# Patient Record
Sex: MALE | Race: WHITE | Employment: OTHER | URBAN - METROPOLITAN AREA
[De-identification: names, ages, dates, MRNs, and addresses within clinical notes are randomized per-mention and may not be internally consistent; named-entity substitution may affect disease eponyms.]

---

## 2017-08-24 ENCOUNTER — HOSPITAL ENCOUNTER (OUTPATIENT)
Dept: PHYSICAL THERAPY | Age: 57
Discharge: HOME | End: 2017-08-24
Payer: COMMERCIAL

## 2017-08-24 PROCEDURE — 97110 THERAPEUTIC EXERCISES: CPT | Performed by: PHYSICAL THERAPIST

## 2017-08-24 PROCEDURE — 97162 PT EVAL MOD COMPLEX 30 MIN: CPT | Performed by: PHYSICAL THERAPIST

## 2017-08-24 PROCEDURE — 97140 MANUAL THERAPY 1/> REGIONS: CPT | Performed by: PHYSICAL THERAPIST
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
## 2017-08-24 NOTE — PROGRESS NOTES
PT DAILY TREATMENT NOTE     Patient Name: Angelina Banegas  Date:2017  : 1960  [x]  Patient  Verified  Payor: [employer] / Plan: [employer] / Product Type: HMO /    In time:1010  Out time:1045  Total Treatment Time (min): 35  Visit #: 1 of 12    Treatment Area: Complete rotator cuff tear or rupture of right shoulder, not specified as traumatic [M75.121]    SUBJECTIVE  Pain Level (0-10 scale): 8/10  Any medication changes, allergies to medications, adverse drug reactions, diagnosis change, or new procedure performed?: [x] No    [] Yes (see summary sheet for update)  Subjective functional status/changes:   [] No changes reported  HPI: Pt c/o increased pain in the R shoulder and neck beginning about 3 months ago w/o known KHLOE. Reports pain increases w/ using the arm, moving it in the wrong direction, or bumping it. He is unsure what decreases his pain as it is always elevated. Denies n/t. Reports MRI shows a massive RTC tear that is un-repairable. Reports an injection yesterday but otherwise denies any other treatment for the pain and reports that he is skeptical of what we are going to accomplish and why he's in PT. Denies pervious surgeries or R shoulder injuries. Pt is R hand dominant. 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      OBJECTIVE    Modality rationale:  to improve the patients ability to    Min Type Additional Details    [] Estim:  []Unatt       []IFC  []Premod                        []Other:  []w/ice   []w/heat  Position:  Location:    [] Estim: []Att    []TENS instruct  []NMES                    []Other:  []w/US   []w/ice   []w/heat  Position:  Location:    []  Traction: [] Cervical       []Lumbar                       [] Prone          []Supine                       []Intermittent   []Continuous Lbs:  [] before manual  [] after manual    []  Ultrasound: []Continuous   [] Pulsed                           []1MHz   []3MHz W/cm2:  Location:    []  Iontophoresis with dexamethasone         Location: [] Take home patch   [] In clinic    []  Ice     []  heat  []  Ice massage  []  Laser   []  Anodyne Position:  Location:    []  Laser with stim  []  Other:  Position:  Location:    []  Vasopneumatic Device Pressure:       [] lo [] med [] hi   Temperature: [] lo [] med [] hi   [] Skin assessment post-treatment:  []intact []redness- no adverse reaction    []redness - adverse reaction:     17 min [x]Eval                  []Re-Eval       10 min Therapeutic Exercise:  [x] See flow sheet : instructed in and demo'd HEP, educated on purpose of PT w/ massive RTC tear and where we anticipate going and achieving. Rationale: increase ROM, increase strength, improve coordination and increase proprioception to improve the patients ability to decrease pain and improve activity tolerance      min Therapeutic Activity:  []  See flow sheet :   Rationale:   to improve the patients ability to       min Neuromuscular Re-education:  []  See flow sheet :   Rationale:   to improve the patients ability to     8 min Manual Therapy:  STJ mobs, manual str into flex, scap, IR and ER R shoulder, TPR/DTM to R UT and LS, and parascapular mm   Rationale: decrease pain, increase ROM, increase tissue extensibility and decrease trigger points to improve activity tolerance and mobility      min Gait Training:  ___ feet with ___ device on level surfaces with ___ level of assist   Rationale: With   [] TE   [] TA   [] neuro   [] other: Patient Education: [x] Review HEP    [] Progressed/Changed HEP based on:   [] positioning   [] body mechanics   [] transfers   [] heat/ice application    [] other:      Other Objective/Functional Measures: Pt presents w/ slumped posture and rounded shoulders. PROM R shoulder full w/ just min pain reported ar EROM all planes.  AROM R shoulder flex and scap WFL w/ pain reported from 110 deg up to EROM, IR to L1 behind the back and pain free, ER to occiput w/ compensations and pain reported. MMT R shoulder flex 4/5 w/pain, scap -4/5 w/ pain, IR 4/5 w/pain, ER 3+/5 w/ pain. Increased mm tightness noted in the R UT and LS, and parascapular mm. + empty can for pain R, + crossover R. C/S AROM WFL but painful into R SB and rotation. Pain Level (0-10 scale) post treatment: 6/10    ASSESSMENT/Changes in Function: Focus on improving scapular stability and R shoulder MMT to improve activity tolerance and decrease pain w/ sleeping. Patient will continue to benefit from skilled PT services to modify and progress therapeutic interventions, address functional mobility deficits, address ROM deficits, address strength deficits, analyze and address soft tissue restrictions, analyze and cue movement patterns, analyze and modify body mechanics/ergonomics, assess and modify postural abnormalities and instruct in home and community integration to attain remaining goals. [x]  See Plan of Care  []  See progress note/recertification  []  See Discharge Summary         Progress towards goals / Updated goals:  Short Term Goals: To be accomplished in 1 weeks:  1. Pt will be independent and complaint w/ HEP to progress gains in PT. At eval: initiated HEP  Long Term Goals: To be accomplished in 4 weeks:  1. Pt will improve FOTO to > or = to 67 to demo improved function. At eval: FOTO = 46  2. Pt will improve R shoulder AROM to pain free for ease w/ dressing. At eval: AROM R shoulder flex and scap WFL w/ pain reported from 110 deg up to EROM, IR to L1 behind the back and pain free, ER to occiput w/ compensations and pain reported  3. Pt will increase R shoulder MMT to > or = to 4-4+/5 and pain free grossly for ease w/ shooting a rifle. At eval: MMT R shoulder flex 4/5 w/pain, scap -4/5 w/ pain, IR 4/5 w/pain, ER 3+/5 w/ pain  4. Pt will report > or = to 60% improvement in symptoms for ease w/ pain free work tasks. At eval: 0%    PLAN  []  Upgrade activities as tolerated     []  Continue plan of care  []  Update interventions per flow sheet       []  Discharge due to:_  [x]  Other: 3x/4 weeks      Bebe Messina, PT 8/24/2017  2:39 PM    Future Appointments  Date Time Provider Hosea Kong   8/28/2017 9:00 AM Jaycee E Laws, PTA [facility]   9/1/2017 9:00 AM Jaycee E Laws, PTA [facility]   9/6/2017 8:30 AM Jaycee E Laws, PTA [facility]   9/7/2017 9:00 AM Jaycee E Laws, PTA [facility]   9/11/2017 8:30 AM Jaycee E Laws, PTA [facility]   9/15/2017 9:00 AM Jaycee E Laws, PTA [facility]   9/20/2017 9:00 AM Jaycee E Laws, PTA [facility]   9/21/2017 10:30 AM Jaycee E Laws, PTA [facility]   9/25/2017 9:00 AM Jaycee E Laws, PTA [facility]   9/29/2017 10:00 AM Bebe Messina, PT [facility]

## 2017-08-28 ENCOUNTER — HOSPITAL ENCOUNTER (OUTPATIENT)
Dept: PHYSICAL THERAPY | Age: 57
Discharge: HOME | End: 2017-08-28
Payer: COMMERCIAL

## 2017-08-28 PROCEDURE — 97110 THERAPEUTIC EXERCISES: CPT

## 2017-08-28 PROCEDURE — 97112 NEUROMUSCULAR REEDUCATION: CPT

## 2017-08-28 PROCEDURE — 97140 MANUAL THERAPY 1/> REGIONS: CPT

## 2017-08-28 NOTE — PROGRESS NOTES
PT DAILY TREATMENT NOTE     Patient Name: Valeria Ellis  Date:2017  : 1960  [x]  Patient  Verified  Payor: Christie Ayers / Plan: [address] [employer] / Product Type: HMO /    In time: 8:55  Out time:9:50  Total Treatment Time (min): 55   Visit #: 2 of 12    Treatment Area: Complete rotator cuff tear or rupture of right shoulder, not specified as traumatic [M75.121]    SUBJECTIVE  Pain Level (0-10 scale): 6/10  Any medication changes, allergies to medications, adverse drug reactions, diagnosis change, or new procedure performed?: [x] No    [] Yes (see summary sheet for update)  Subjective functional status/changes:   [] No changes reported  Pt states that pushing up on the arm increases pain. He reports he feels the his lack of strength is pushing away from his body.      OBJECTIVE    Modality rationale:    Min Type Additional Details    [] Estim:  []Unatt       []IFC  []Premod                        []Other:  []w/ice   []w/heat  Position:  Location:    [] Estim: []Att    []TENS instruct  []NMES                    []Other:  []w/US   []w/ice   []w/heat  Position:  Location:    []  Traction: [] Cervical       []Lumbar                       [] Prone          []Supine                       []Intermittent   []Continuous Lbs:  [] before manual  [] after manual    []  Ultrasound: []Continuous   [] Pulsed                           []1MHz   []3MHz W/cm2:  Location:    []  Iontophoresis with dexamethasone         Location: [] Take home patch   [] In clinic    []  Ice     []  heat  []  Ice massage  []  Laser   []  Anodyne Position:  Location:    []  Laser with stim  []  Other:  Position:  Location:    []  Vasopneumatic Device Pressure:       [] lo [] med [] hi   Temperature: [] lo [] med [] hi   [] Skin assessment post-treatment:  []intact []redness- no adverse reaction    []redness - adverse reaction:     15 min Therapeutic Exercise:  [x] See flow sheet :   Rationale: increase ROM and increase strength to improve the patients ability to increase ease of ADLs. 30 min Neuromuscular Re-education:  [x]  See flow sheet :   Rationale: increase strength  to improve the patients ability to perform work duties. 10 min Manual Therapy:  Per flow sheet   Rationale: decrease pain, increase tissue extensibility and decrease trigger points to increase ease of shooting his firearm. With   [] TE   [] TA   [] neuro   [] other: Patient Education: [x] Review HEP    [] Progressed/Changed HEP based on:   [] positioning   [] body mechanics   [] transfers   [] heat/ice application    [] other:      Other Objective/Functional Measures: Pt reports compliance of HEP 1X per day. Pain Level (0-10 scale) post treatment: 7/10    ASSESSMENT/Changes in Function: No TTP of parascapular mm. TP in R UT.  VCs to decrease UT substitution. Patient will continue to benefit from skilled PT services to modify and progress therapeutic interventions, address functional mobility deficits, address ROM deficits and address strength deficits to attain remaining goals. []  See Plan of Care  []  See progress note/recertification  []  See Discharge Summary         Progress towards goals / Updated goals:  Short Term Goals: To be accomplished in 1 weeks:  1. Pt will be independent and complaint w/ HEP to progress gains in PT. At eval: initiated HEP  Current: Progressing, pt is compliant with HEP 1x per day. 8/28/17   Long Term Goals: To be accomplished in 4 weeks:  1. Pt will improve FOTO to > or = to 67 to demo improved function. At eval: FOTO = 46  2. Pt will improve R shoulder AROM to pain free for ease w/ dressing. At eval: AROM R shoulder flex and scap WFL w/ pain reported from 110 deg up to EROM, IR to L1 behind the back and pain free, ER to occiput w/ compensations and pain reported  3. Pt will increase R shoulder MMT to > or = to 4-4+/5 and pain free grossly for ease w/ shooting a rifle.    At eval: MMT R shoulder flex 4/5 w/pain, scap -4/5 w/ pain, IR 4/5 w/pain, ER 3+/5 w/ pain  4. Pt will report > or = to 60% improvement in symptoms for ease w/ pain free work tasks.    At eval: 0%    PLAN  []  Upgrade activities as tolerated     [x]  Continue plan of care  []  Update interventions per flow sheet       []  Discharge due to:_  []  Other:_      Jaycee E Laws, PTA 8/28/2017  9:55 AM    Future Appointments  Date Time Provider Hosea Kong   9/1/2017 9:00 AM Jaycee E Laws, PTA [facility]   9/6/2017 8:30 AM Jaycee E Laws, PTA [facility]   9/7/2017 9:00 AM Jaycee E Laws, PTA [facility]   9/11/2017 8:30 AM Jaycee E Laws, PTA [facility]   9/15/2017 9:00 AM Jaycee E Laws, PTA [facility]   9/20/2017 9:00 AM Jaycee E Laws, PTA [facility]   9/21/2017 10:30 AM Jaycee E Laws, PTA [facility]   9/25/2017 9:00 AM Jaycee E Laws, PTA [facility]   9/29/2017 10:00 AM Dean Cole, PT [facility]

## 2017-09-01 ENCOUNTER — HOSPITAL ENCOUNTER (OUTPATIENT)
Dept: PHYSICAL THERAPY | Age: 57
Discharge: HOME | End: 2017-09-01
Payer: COMMERCIAL

## 2017-09-01 PROCEDURE — 97140 MANUAL THERAPY 1/> REGIONS: CPT

## 2017-09-01 PROCEDURE — 97112 NEUROMUSCULAR REEDUCATION: CPT

## 2017-09-01 PROCEDURE — 97110 THERAPEUTIC EXERCISES: CPT
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
## 2017-09-06 ENCOUNTER — HOSPITAL ENCOUNTER (OUTPATIENT)
Dept: PHYSICAL THERAPY | Age: 57
Discharge: HOME | End: 2017-09-06
Payer: COMMERCIAL

## 2017-09-06 PROCEDURE — 97140 MANUAL THERAPY 1/> REGIONS: CPT

## 2017-09-06 PROCEDURE — 97110 THERAPEUTIC EXERCISES: CPT

## 2017-09-06 PROCEDURE — 97112 NEUROMUSCULAR REEDUCATION: CPT

## 2017-09-06 NOTE — PROGRESS NOTES
PT DAILY TREATMENT NOTE     Patient Name: Teresa Urrutia  Date:2017  : 1960  [x]  Patient  Verified  Payor: Kathe Cobian / Plan: [facility] [employer]  [facility] PT / Product Type: Commerical /    In time: 8:29  Out time:9:20  Total Treatment Time (min): 51  Visit #: 4 of 12    Treatment Area: Complete rotator cuff tear or rupture of right shoulder, not specified as traumatic [M75.121]    SUBJECTIVE  Pain Level (0-10 scale): 3/10  Any medication changes, allergies to medications, adverse drug reactions, diagnosis change, or new procedure performed?: [x] No    [] Yes (see summary sheet for update)  Subjective functional status/changes:   [] No changes reported  Pt states his shoulder is feeling better compared to last time. OBJECTIVE    Modality rationale:    Min Type Additional Details    [] Estim:  []Unatt       []IFC  []Premod                        []Other:  []w/ice   []w/heat  Position:  Location:    [] Estim: []Att    []TENS instruct  []NMES                    []Other:  []w/US   []w/ice   []w/heat  Position:  Location:    []  Traction: [] Cervical       []Lumbar                       [] Prone          []Supine                       []Intermittent   []Continuous Lbs:  [] before manual  [] after manual    []  Ultrasound: []Continuous   [] Pulsed                           []1MHz   []3MHz W/cm2:  Location:    []  Iontophoresis with dexamethasone         Location: [] Take home patch   [] In clinic    []  Ice     []  heat  []  Ice massage  []  Laser   []  Anodyne Position:  Location:    []  Laser with stim  []  Other:  Position:  Location:    []  Vasopneumatic Device Pressure:       [] lo [] med [] hi   Temperature: [] lo [] med [] hi   [] Skin assessment post-treatment:  []intact []redness- no adverse reaction    []redness - adverse reaction:     18 min Therapeutic Exercise:  [x] See flow sheet :   Rationale: increase ROM and increase strength to improve the patients ability to increase ease of ADLs.       25 min Neuromuscular Re-education:  [x]  See flow sheet : Scapular stability per flow sheet   Rationale: increase strength  to improve the patients ability to increase ease of ADLs. 8 min Manual Therapy:  Per flow sheet   Rationale: decrease pain and increase ROM to increase ease of ADLs. With   [] TE   [] TA   [] neuro   [] other: Patient Education: [x] Review HEP    [] Progressed/Changed HEP based on:   [] positioning   [] body mechanics   [] transfers   [] heat/ice application    [] other:      Other Objective/Functional Measures: flex WNL (pain beging at 140 degrees), Scap WNL (pain free), ER pointer finger to T2 (pain free), IR thumb to L3 (with pain)     Pain Level (0-10 scale) post treatment: 5-6/10    ASSESSMENT/Changes in Function: Cont per POC. Patient will continue to benefit from skilled PT services to modify and progress therapeutic interventions, address functional mobility deficits, address ROM deficits and address strength deficits to attain remaining goals. []  See Plan of Care  []  See progress note/recertification  []  See Discharge Summary         Progress towards goals / Updated goals:  Short Term Goals: To be accomplished in 1 weeks:  1. Pt will be independent and complaint w/ HEP to progress gains in PT. At eval: initiated HEP  Current: Progressing, pt is compliant with HEP 1x per day.  8/28/17   Long Term Goals: To be accomplished in 4 weeks:  1. Pt will improve FOTO to > or = to 67 to demo improved function. At eval: FOTO = 46  2. Pt will improve R shoulder AROM to pain free for ease w/ dressing. At eval: AROM R shoulder flex and scap WFL w/ pain reported from 110 deg up to EROM, IR to L1 behind the back and pain free, ER to occiput w/ compensations and pain reported  Current: Progressing, flex WNL (pain beging at 140 degrees), Scap WNL (pain free), ER pointer finger to T2 (pain free), IR thumb to L3 (with pain). 9/6/17  3.  Pt will increase R shoulder MMT to > or = to 4-4+/5 and pain free grossly for ease w/ shooting a rifle. At eval: MMT R shoulder flex 4/5 w/pain, scap -4/5 w/ pain, IR 4/5 w/pain, ER 3+/5 w/ pain  4. Pt will report > or = to 60% improvement in symptoms for ease w/ pain free work tasks.    At eval: 0%    PLAN  []  Upgrade activities as tolerated     [x]  Continue plan of care  []  Update interventions per flow sheet       []  Discharge due to:_  []  Other:_      Jaycee Amaya, PTA 9/6/2017  9:20 AM    Future Appointments  Date Time Provider Hosea Kong   9/7/2017 5:00 PM Luis Griffin, MARIA INES [facility]   9/11/2017 8:30 AM Jaycee Amaya, PTA [facility]   9/15/2017 9:00 AM Jaycee Amaya, PTA [facility]   9/20/2017 9:00 AM Jayceeserene Amaya, PTA [facility]   9/21/2017 10:30 AM Jayceesreene Amaya, PTA [facility]   9/25/2017 9:00 AM Jayceeserene Amaya, PTA [facility]   9/29/2017 10:00 AM Renee Mock, PT [facility]

## 2017-09-07 ENCOUNTER — HOSPITAL ENCOUNTER (OUTPATIENT)
Dept: PHYSICAL THERAPY | Age: 57
Discharge: HOME | End: 2017-09-07
Payer: COMMERCIAL

## 2017-09-07 PROCEDURE — 97112 NEUROMUSCULAR REEDUCATION: CPT

## 2017-09-07 PROCEDURE — 97110 THERAPEUTIC EXERCISES: CPT

## 2017-09-07 PROCEDURE — 97140 MANUAL THERAPY 1/> REGIONS: CPT

## 2017-09-07 NOTE — PROGRESS NOTES
PT DAILY TREATMENT NOTE     Patient Name: Angelina Banegas  Date:2017  : 1960  [x]  Patient  Verified  Payor: Red Backer / [facility] / Product Type: Commerical /    In time: 4:51  Out time:5:45  Total Treatment Time (min): 54  Visit #: 5 of 12    Treatment Area: Complete rotator cuff tear or rupture of right shoulder, not specified as traumatic [M75.121]    SUBJECTIVE  Pain Level (0-10 scale): 7  Any medication changes, allergies to medications, adverse drug reactions, diagnosis change, or new procedure performed?: [x] No    [] Yes (see summary sheet for update)  Subjective functional status/changes:   [] No changes reported  Pt reports he did some weed eating yesterday and that bothered his shoulder some.      OBJECTIVE    Modality rationale:    Min Type Additional Details    [] Estim:  []Unatt       []IFC  []Premod                        []Other:  []w/ice   []w/heat  Position:  Location:    [] Estim: []Att    []TENS instruct  []NMES                    []Other:  []w/US   []w/ice   []w/heat  Position:  Location:    []  Traction: [] Cervical       []Lumbar                       [] Prone          []Supine                       []Intermittent   []Continuous Lbs:  [] before manual  [] after manual    []  Ultrasound: []Continuous   [] Pulsed                           []1MHz   []3MHz W/cm2:  Location:    []  Iontophoresis with dexamethasone         Location: [] Take home patch   [] In clinic    []  Ice     []  heat  []  Ice massage  []  Laser   []  Anodyne Position:  Location:    []  Laser with stim  []  Other:  Position:  Location:    []  Vasopneumatic Device Pressure:       [] lo [] med [] hi   Temperature: [] lo [] med [] hi   [] Skin assessment post-treatment:  []intact []redness- no adverse reaction    []redness - adverse reaction:       21 min Therapeutic Exercise:  [x] See flow sheet :   Rationale: increase ROM and increase strength to improve the patients ability to increase tolerance to activities. 25 min Neuromuscular Re-education:  [x]  See flow sheet :scap stability exercises. Rationale: increase ROM and increase strength  to improve the patients ability to increase ease with overhead activities. 8 min Manual Therapy:  Per flow sheet   Rationale: decrease pain, increase ROM, increase tissue extensibility and decrease trigger points to increase ease with ADLs. .           With   [] TE   [] TA   [] neuro   [] other: Patient Education: [x] Review HEP    [] Progressed/Changed HEP based on:   [] positioning   [] body mechanics   [] transfers   [] heat/ice application    [] other:      Other Objective/Functional Measures: Pt reports performing HEP 2x per day. Pain Level (0-10 scale) post treatment: 5    ASSESSMENT/Changes in Function: Pt was able to tolerate exercises well. VC's for reminding pt to perform exercises in a pain free range. Pt was provided with updated HEP and orange and yellow therabands. Patient will continue to benefit from skilled PT services to modify and progress therapeutic interventions, address functional mobility deficits, address ROM deficits, address strength deficits and analyze and address soft tissue restrictions to attain remaining goals. []  See Plan of Care  []  See progress note/recertification  []  See Discharge Summary         Progress towards goals / Updated goals:  Short Term Goals: To be accomplished in 1 weeks:  1. Pt will be independent and complaint w/ HEP to progress gains in PT. At eval: initiated HEP  Current: Goal met: Pt reports performing HEP. 9/7/17  Long Term Goals: To be accomplished in 4 weeks:  1. Pt will improve FOTO to > or = to 67 to demo improved function. At eval: FOTO = 46  2. Pt will improve R shoulder AROM to pain free for ease w/ dressing.   At eval: AROM R shoulder flex and scap WFL w/ pain reported from 110 deg up to EROM, IR to L1 behind the back and pain free, ER to occiput w/ compensations and pain reported  Current: Progressing, flex WNL (pain beging at 140 degrees), Scap WNL (pain free), ER pointer finger to T2 (pain free), IR thumb to L3 (with pain). 9/6/17  3. Pt will increase R shoulder MMT to > or = to 4-4+/5 and pain free grossly for ease w/ shooting a rifle. At eval: MMT R shoulder flex 4/5 w/pain, scap -4/5 w/ pain, IR 4/5 w/pain, ER 3+/5 w/ pain  4. Pt will report > or = to 60% improvement in symptoms for ease w/ pain free work tasks.    At eval: 0%       PLAN  []  Upgrade activities as tolerated     [x]  Continue plan of care  []  Update interventions per flow sheet       []  Discharge due to:_  []  Other:_      Chase Finley PTA 9/7/2017  4:56 PM    Future Appointments  Date Time Provider Hosea Kong   9/7/2017 5:00 PM Chase Finley PTA [facility]   9/11/2017 8:30 AM Jayceeserene Amaya, PTA [facility]   9/15/2017 9:00 AM Jayceeserene Amaya, PTA [facility]   9/20/2017 9:00 AM Jayceeserene Amaya, PTA [facility]   9/21/2017 10:30 AM Jayceeserene Amaya, MARIA INES [facility]   9/25/2017 9:00 AM Jaycee CHANDRA Amaya, PTA [facility]   9/29/2017 10:00 AM Risa Panchal, PT [facility]

## 2017-09-11 ENCOUNTER — HOSPITAL ENCOUNTER (OUTPATIENT)
Dept: PHYSICAL THERAPY | Age: 57
Discharge: HOME | End: 2017-09-11
Payer: COMMERCIAL

## 2017-09-11 PROCEDURE — 97112 NEUROMUSCULAR REEDUCATION: CPT

## 2017-09-11 PROCEDURE — 97110 THERAPEUTIC EXERCISES: CPT

## 2017-09-11 PROCEDURE — 97140 MANUAL THERAPY 1/> REGIONS: CPT

## 2017-09-11 NOTE — PROGRESS NOTES
PT DAILY TREATMENT NOTE 12    Patient Name: Anita Cancino  Date:2017  : 1960  [x]  Patient  Verified  Payor: Alphonse Garcia / Plan: [facility] [employer]  CAPITATED PT / Product Type: Commerical /    In time: 8:24  Out time:9:16  Total Treatment Time (min): 52  Visit #: 6 of 12    Treatment Area: Complete rotator cuff tear or rupture of right shoulder, not specified as traumatic [M75.121]    SUBJECTIVE  Pain Level (0-10 scale): 3-4/10  Any medication changes, allergies to medications, adverse drug reactions, diagnosis change, or new procedure performed?: [x] No    [] Yes (see summary sheet for update)  Subjective functional status/changes:   [] No changes reported  Pt states over the weekend he started having increased pain into his neck area (UT). OBJECTIVE    Modality rationale: decrease pain to improve the patients ability to increase ease of ADLs.     Min Type Additional Details    [] Estim:  []Unatt       []IFC  []Premod                        []Other:  []w/ice   []w/heat  Position:  Location:    [] Estim: []Att    []TENS instruct  []NMES                    []Other:  []w/US   []w/ice   []w/heat  Position:  Location:    []  Traction: [] Cervical       []Lumbar                       [] Prone          []Supine                       []Intermittent   []Continuous Lbs:  [] before manual  [] after manual    []  Ultrasound: []Continuous   [] Pulsed                           []1MHz   []3MHz W/cm2:  Location:    []  Iontophoresis with dexamethasone         Location: [] Take home patch   [] In clinic   10 [x]  Ice     []  heat  []  Ice massage  []  Laser   []  Anodyne Position: seated  Location: R shoulder    []  Laser with stim  []  Other:  Position:  Location:    []  Vasopneumatic Device Pressure:       [] lo [] med [] hi   Temperature: [] lo [] med [] hi   [] Skin assessment post-treatment:  []intact []redness- no adverse reaction    []redness - adverse reaction:     9 min Therapeutic Exercise:  [x] See flow sheet : Rationale: increase ROM and increase strength to improve the patients ability to perform ADLs. 23 min Neuromuscular Re-education:  [x]  See flow sheet :   Rationale: increase strength  to improve the patients ability to increase ease of ADLs. 10 min Manual Therapy:  Per flow sheet   Rationale: decrease pain, increase ROM and increase tissue extensibility to increase ease of ADLs. With   [] TE   [] TA   [] neuro   [] other: Patient Education: [x] Review HEP    [] Progressed/Changed HEP based on:   [] positioning   [] body mechanics   [] transfers   [] heat/ice application    [] other:      Other Objective/Functional Measures: FOTO 57     Pain Level (0-10 scale) post treatment: 3-4 (UT)     ASSESSMENT/Changes in Function: Pt is progressing as FOTO has increased by 11 since [facility]. Patient will continue to benefit from skilled PT services to modify and progress therapeutic interventions, address functional mobility deficits, address ROM deficits and address strength deficits to attain remaining goals. []  See Plan of Care  []  See progress note/recertification  []  See Discharge Summary         Progress towards goals / Updated goals:  Short Term Goals: To be accomplished in 1 weeks:  1. Pt will be independent and complaint w/ HEP to progress gains in PT. At eval: initiated HEP  Current: Goal met: Pt reports performing HEP. 9/7/17  Long Term Goals: To be accomplished in 4 weeks:  1. Pt will improve FOTO to > or = to 67 to demo improved function. At [facility]: FOTO = 46  Current: Progressing, 57, an increase of 11 since [facility].  9/11/17  2. Pt will improve R shoulder AROM to pain free for ease w/ dressing.   At eval: AROM R shoulder flex and scap WFL w/ pain reported from 110 deg up to EROM, IR to L1 behind the back and pain free, ER to occiput w/ compensations and pain reported  Current: Progressing, flex WNL (pain beging at 140 degrees), Scap WNL (pain free), ER pointer finger to T2 (pain free), IR thumb to L3 (with pain).  9/6/17  3. Pt will increase R shoulder MMT to > or = to 4-4+/5 and pain free grossly for ease w/ shooting a rifle. At eval: MMT R shoulder flex 4/5 w/pain, scap -4/5 w/ pain, IR 4/5 w/pain, ER 3+/5 w/ pain  4. Pt will report > or = to 60% improvement in symptoms for ease w/ pain free work tasks.    At eval: 0%    PLAN  []  Upgrade activities as tolerated     [x]  Continue plan of care  []  Update interventions per flow sheet       []  Discharge due to:_  []  Other:_      Jaycee Amaya PTA 9/11/2017  10:09 AM    Future Appointments  Date Time Provider Hosea Kong   9/15/2017 9:00 AM Jaycee Amaya PTA [facility]   9/20/2017 9:00 AM Jaycee Amaya PTA [facility]   9/21/2017 10:30 AM Jaycee Amaya, PTA [facility]   9/25/2017 9:00 AM Jaycee Amaya PTA [facility]   9/29/2017 10:00 AM Reuben Eisenmenger, PT [facility]

## 2017-09-15 ENCOUNTER — HOSPITAL ENCOUNTER (OUTPATIENT)
Dept: PHYSICAL THERAPY | Age: 57
Discharge: HOME | End: 2017-09-15
Payer: COMMERCIAL

## 2017-09-15 PROCEDURE — 97140 MANUAL THERAPY 1/> REGIONS: CPT

## 2017-09-15 PROCEDURE — 97112 NEUROMUSCULAR REEDUCATION: CPT

## 2017-09-15 PROCEDURE — 97110 THERAPEUTIC EXERCISES: CPT

## 2017-09-15 NOTE — PROGRESS NOTES
PT DAILY TREATMENT NOTE     Patient Name: Siri Baumgarten  Date:9/15/2017  : 1960  [x]  Patient  Verified  Payor: Jeremie Bowling / [place]: [facility] [employer] / Product Type: Commerical /    In time: 8:57  Out time:9:42  Total Treatment Time (min): 45  Visit #: 7 of 12    Treatment Area: Complete rotator cuff tear or rupture of right shoulder, not specified as traumatic [M75.121]    SUBJECTIVE  Pain Level (0-10 scale): 3/10  Any medication changes, allergies to medications, adverse drug reactions, diagnosis change, or new procedure performed?: [x] No    [] Yes (see summary sheet for update)  Subjective functional status/changes:   [] No changes reported  Pt states his shoulder has been feeling pretty good. OBJECTIVE    Modality rationale:    Min Type Additional Details    [] Estim:  []Unatt       []IFC  []Premod                        []Other:  []w/ice   []w/heat  Position:  Location:    [] Estim: []Att    []TENS instruct  []NMES                    []Other:  []w/US   []w/ice   []w/heat  Position:  Location:    []  Traction: [] Cervical       []Lumbar                       [] Prone          []Supine                       []Intermittent   []Continuous Lbs:  [] before manual  [] after manual    []  Ultrasound: []Continuous   [] Pulsed                           []1MHz   []3MHz W/cm2:  Location:    []  Iontophoresis with dexamethasone         Location: [] Take home patch   [] In clinic    []  Ice     []  heat  []  Ice massage  []  Laser   []  Anodyne Position:  Location:    []  Laser with stim  []  Other:  Position:  Location:    []  Vasopneumatic Device Pressure:       [] lo [] med [] hi   Temperature: [] lo [] med [] hi   [] Skin assessment post-treatment:  []intact []redness- no adverse reaction    []redness - adverse reaction:     12 min Therapeutic Exercise:  [x] See flow sheet :   Rationale: increase ROM and increase strength to improve the patients ability to increase ease of house hold duties. 23 min Neuromuscular Re-education:  [x]  See flow sheet : Scapular stability per flow sheet   Rationale: increase strength  to improve the patients ability to increase ease of work activities. 10 min Manual Therapy:  Per flow sheet   Rationale: decrease pain, increase ROM and increase tissue extensibility to increase ease of ADLs. With   [] TE   [] TA   [] neuro   [] other: Patient Education: [x] Review HEP    [] Progressed/Changed HEP based on:   [] positioning   [] body mechanics   [] transfers   [] heat/ice application    [] other:      Other Objective/Functional Measures: MMT: flex 4/5 with pain, 5/5 without pain, ER 4-/5 with pain, IR 4+/5. Pain Level (0-10 scale) post treatment: 2/10    ASSESSMENT/Changes in Function: Pt is progressing as scap and IR strength have improved. Patient will continue to benefit from skilled PT services to modify and progress therapeutic interventions, address functional mobility deficits, address ROM deficits and address strength deficits to attain remaining goals. []  See Plan of Care  []  See progress note/recertification  []  See Discharge Summary         Progress towards goals / Updated goals:  Short Term Goals: To be accomplished in 1 weeks:  1. Pt will be independent and complaint w/ HEP to progress gains in PT. At [facility]: initiated HEP  Current: Goal met: Pt reports performing HEP. 9/7/17  Long Term Goals: To be accomplished in 4 weeks:  1. Pt will improve FOTO to > or = to 67 to demo improved function. At [facility]: FOTO = 46  Current: Progressing, 57, an increase of 11 since [facility].  9/11/17  2. Pt will improve R shoulder AROM to pain free for ease w/ dressing.   At [facility]: AROM R shoulder flex and scap WFL w/ pain reported from 110 deg up to EROM, IR to L1 behind the back and pain free, ER to occiput w/ compensations and pain reported  Current: Progressing, flex WNL (pain beging at 140 degrees), Scap WNL (pain free), ER pointer finger to T2 (pain free), IR thumb to L3 (with pain).  9/6/17  3. Pt will increase R shoulder MMT to > or = to 4-4+/5 and pain free grossly for ease w/ shooting a rifle. At eval: MMT R shoulder flex 4/5 w/pain, scap -4/5 w/ pain, IR 4/5 w/pain, ER 3+/5 w/ pain  Current: Progressing, flex 4/5 with pain, 5/5 without pain, ER 4-/5 with pain, IR 4+/5.  9/15/17  4. Pt will report > or = to 60% improvement in symptoms for ease w/ pain free work tasks.    At eval: 0%    PLAN  []  Upgrade activities as tolerated     [x]  Continue plan of care  []  Update interventions per flow sheet       []  Discharge due to:_  []  Other:_      Jaycee Amaya PTA 9/15/2017  9:27 AM    Future Appointments  Date Time Provider Hosea Kong   9/20/2017 9:00 AM Jaycee Amaya PTA [facility]   9/21/2017 10:30 AM MARIA INES R[facility]   9/25/2017 9:00 AM Jaycee Amaya PTA [facility]   9/29/2017 10:00 AM Yasmine Julio, PT [facility]

## 2017-09-20 ENCOUNTER — HOSPITAL ENCOUNTER (OUTPATIENT)
Dept: PHYSICAL THERAPY | Age: 57
Discharge: HOME | End: 2017-09-20
Payer: COMMERCIAL

## 2017-09-20 PROCEDURE — 97112 NEUROMUSCULAR REEDUCATION: CPT

## 2017-09-20 PROCEDURE — 97110 THERAPEUTIC EXERCISES: CPT

## 2017-09-20 PROCEDURE — 97140 MANUAL THERAPY 1/> REGIONS: CPT

## 2017-09-20 NOTE — PROGRESS NOTES
PT DAILY TREATMENT NOTE     Patient Name: Elinor Goldberg  Date:2017  : 1960  [x]  Patient  Verified  Payor: Denice Youssef / Plan:  [facility] [address] [facility] / Product Type: Commerical /    In time: 8:56  Out time: 9:48  Total Treatment Time (min): 52  Visit #: 8 of 12    Treatment Area: Complete rotator cuff tear or rupture of right shoulder, not specified as traumatic [M75.121]    SUBJECTIVE  Pain Level (0-10 scale): 3/10  Any medication changes, allergies to medications, adverse drug reactions, diagnosis change, or new procedure performed?: [x] No    [] Yes (see summary sheet for update)  Subjective functional status/changes:   [] No changes reported  Pt states he feels improvement in pain control, however strength is still an issue.      OBJECTIVE    Modality rationale:    Min Type Additional Details    [] Estim:  []Unatt       []IFC  []Premod                        []Other:  []w/ice   []w/heat  Position:  Location:    [] Estim: []Att    []TENS instruct  []NMES                    []Other:  []w/US   []w/ice   []w/heat  Position:  Location:    []  Traction: [] Cervical       []Lumbar                       [] Prone          []Supine                       []Intermittent   []Continuous Lbs:  [] before manual  [] after manual    []  Ultrasound: []Continuous   [] Pulsed                           []1MHz   []3MHz W/cm2:  Location:    []  Iontophoresis with dexamethasone         Location: [] Take home patch   [] In clinic    []  Ice     []  heat  []  Ice massage  []  Laser   []  Anodyne Position:  Location:    []  Laser with stim  []  Other:  Position:  Location:    []  Vasopneumatic Device Pressure:       [] lo [] med [] hi   Temperature: [] lo [] med [] hi   [] Skin assessment post-treatment:  []intact []redness- no adverse reaction    []redness - adverse reaction:     19 min Therapeutic Exercise:  [x] See flow sheet :   Rationale: increase ROM and increase strength to improve the patients ability to increase ease of ADLs. 23 min Neuromuscular Re-education:  [x]  See flow sheet : scapular stability  Per    Rationale: increase strength  to improve the patients ability to perform work duties. 10 min Manual Therapy:  Per flow sheet   Rationale: decrease pain, increase ROM and increase tissue extensibility to increase ease of ADLs. With   [] TE   [] TA   [] neuro   [] other: Patient Education: [x] Review HEP    [] Progressed/Changed HEP based on:   [] positioning   [] body mechanics   [] transfers   [] heat/ice application    [] other:      Other Objective/Functional Measures: See goals. Pain Level (0-10 scale) post treatment: 2/10    ASSESSMENT/Changes in Function: Pt is progressing with PT. Pt reports 50% improvement in pain control, however pt reports minimal improvement in strength. MMT as follows: flex 4/5 with pain, scap 5/5 without pain, ER 4-/5 with pain, IR 4+/5. AROM as follows: flex WNL (pain beging at 140 degrees), Scap WNL (pain free), ER pointer finger to T2 (pain free), IR thumb to L3 (with pain). FOTO has increased by 11 since [facility]. Patient will continue to benefit from skilled PT services to modify and progress therapeutic interventions, address functional mobility deficits, address ROM deficits and address strength deficits to attain remaining goals. []  See Plan of Care  []  See progress note/recertification  []  See Discharge Summary         Progress towards goals / Updated goals:  Short Term Goals: To be accomplished in 1 weeks:  1. Pt will be independent and complaint w/ HEP to progress gains in PT. At eval: initiated HEP  Current: Goal met: Pt reports performing HEP. 9/7/17  Long Term Goals: To be accomplished in 4 weeks:  1. Pt will improve FOTO to > or = to 67 to demo improved function. At eval: FOTO = 46  Current: Progressing, 57, an increase of 11 since [facility].  9/11/17  2. Pt will improve R shoulder AROM to pain free for ease w/ dressing.   At eval: AROM R shoulder flex and scap WFL w/ pain reported from 110 deg up to EROM, IR to L1 behind the back and pain free, ER to occiput w/ compensations and pain reported  Current: Progressing, flex WNL (pain beging at 140 degrees), Scap WNL (pain free), ER pointer finger to T2 (pain free), IR thumb to L3 (with pain).  9/6/17  3. Pt will increase R shoulder MMT to > or = to 4-4+/5 and pain free grossly for ease w/ shooting a rifle. At eval: MMT R shoulder flex 4/5 w/pain, scap -4/5 w/ pain, IR 4/5 w/pain, ER 3+/5 w/ pain  Current: Progressing, flex 4/5 with pain, Scap 5/5 without pain, ER 4-/5 with pain, IR 4+/5.  9/15/17  4. Pt will report > or = to 60% improvement in symptoms for ease w/ pain free work tasks. At eval: 0%  Current: Progressing, 50% (in pain control).   9/20/17    PLAN  []  Upgrade activities as tolerated     [x]  Continue plan of care  []  Update interventions per flow sheet       []  Discharge due to:_  []  Other:_      Jaycee Amaya PTA 9/20/2017  9:07 AM    Future Appointments  Date Time Provider Hosea Kong   9/22/2017 9:00 AM Kenny Matthews PTA [facility]   9/25/2017 9:00 AM MARIA INES Rivas [facility]   9/29/2017 10:00 AM Salas Escamilla PT [facility]

## 2017-09-20 NOTE — PROGRESS NOTES
[facility] - [facility]              [address]        [place], [address] [place]   [phone] fax    Progress Note  Patient name: Claudeen Siskin Start of Care: 17   Referral source: Endy Flores MD : 1960   Medical/Treatment Diagnosis: Complete rotator cuff tear or rupture of right shoulder, not specified as traumatic [M75.121] Onset Date:3 months prior to initial visit     Prior Hospitalization: see medical history Provider#: [id number]   Medications: Verified on Patient Summary List    Comorbidities:  Arthritis, back pain, BMI > 30, HBP, kidney, bladder, prostate, or urination problems   Prior Level of Function: independent w/ ADLs and pain free with work tasks, no R shoulder pain, pt is R hand dominant  Visits from Start of Care: 8    Missed Visits: 0    Established Goals:        Excellent         Good         Limited            None  [] Increased ROM   []  []  []  []  [] Increased Strength  []  []  []  []  [] Increased Mobility  []  []  []  []   [] Decreased Pain   []  []  []  []  [] Decreased Swelling  []  []  []  []    Key Functional Changes:   Progress towards goals / Updated goals:  Short Term Goals: To be accomplished in 1 weeks:  1. Pt will be independent and complaint w/ HEP to progress gains in PT. At eval: initiated HEP  Current: Goal met: Pt reports performing HEP  Long Term Goals: To be accomplished in 4 weeks:  1. Pt will improve FOTO to > or = to 67 to demo improved function. At [facility]: FOTO = 46  Current: Progressing, 57, an increase of 11 since [facility]  2. Pt will improve R shoulder AROM to pain free for ease w/ dressing.   At eval: AROM R shoulder flex and scap WFL w/ pain reported from 110 deg up to EROM, IR to L1 behind the back and pain free, ER to occiput w/ compensations and pain reported  Current: Progressing, flex WNL (pain beging at 140 degrees), Scap WNL (pain free), ER pointer finger to T2 (pain free), IR thumb to L3 (with pain)  3. Pt will increase R shoulder MMT to > or = to 4-4+/5 and pain free grossly for ease w/ shooting a rifle. At eval: MMT R shoulder flex 4/5 w/pain, scap -4/5 w/ pain, IR 4/5 w/pain, ER 3+/5 w/ pain  Current: Progressing, flex 4/5 with pain, Scap 5/5 without pain, ER 4-/5 with pain, IR 4+/5  4. Pt will report > or = to 60% improvement in symptoms for ease w/ pain free work tasks. At eval: 0%  Current: Progressing, 50% (in pain control)    Pt is progressing well with PT and reports 50% improvement in pain control, however pt reports minimal improvement in strength. MMT as follows: flex 4/5 with pain, scap 5/5 without pain, ER 4-/5 with pain, IR 4+/5. AROM as follows: flex WNL (pain beging at 140 degrees), Scap WNL (pain free), ER index finger to T2 (pain free), IR thumb to L3 (with pain). FOTO has increased by 11 since [facility]. Patient will continue to benefit from skilled PT services to modify and progress therapeutic interventions, address functional mobility deficits, address ROM deficits and address strength deficits to attain remaining goals.     Updated Goals: to be achieved in 4 weeks:   Continue with unmet goals above    ASSESSMENT/RECOMMENDATIONS:  [x]Continue therapy per initial plan/protocol at a frequency of  2 x per week for 4 weeks  []Continue therapy with the following recommended changes:_____________________      _____________________________________________________________________  []Discontinue therapy progressing towards or have reached established goals  []Discontinue therapy due to lack of appreciable progress towards goals  []Discontinue therapy due to lack of attendance or compliance  []Await Physician's recommendations/decisions regarding therapy  []Other:________________________________________________________________    Thank you for this referral.    Dean Cole, PT 9/20/2017 12:47 PM  NOTE TO PHYSICIAN:  PLEASE COMPLETE THE ORDERS BELOW AND   FAX TO [facility]: [phone]  If you are unable to process this request in 24 hours please contact our office: [phone]    []  I have read the above report and request that my patient continue as recommended. []  I have read the above report and request that my patient continue therapy with the following changes/special instructions:________________________________________  []I have read the above report and request that my patient be discharged from therapy.     Physicians signature: ________________________________Date: _____Time:_____

## 2017-09-21 ENCOUNTER — APPOINTMENT (OUTPATIENT)
Dept: PHYSICAL THERAPY | Age: 57
End: 2017-09-21
Payer: COMMERCIAL

## 2017-09-22 ENCOUNTER — HOSPITAL ENCOUNTER (OUTPATIENT)
Dept: PHYSICAL THERAPY | Age: 57
Discharge: HOME | End: 2017-09-22
Payer: COMMERCIAL

## 2017-09-22 PROCEDURE — 97112 NEUROMUSCULAR REEDUCATION: CPT

## 2017-09-22 PROCEDURE — 97140 MANUAL THERAPY 1/> REGIONS: CPT

## 2017-09-22 PROCEDURE — 97110 THERAPEUTIC EXERCISES: CPT

## 2017-09-22 NOTE — PROGRESS NOTES
PT DAILY TREATMENT NOTE     Patient Name: Kalina Galloway  Date:2017  : 1960  [x]  Patient  Verified  Payor: Shey Deleon / Plan: [address] [place] [address] Rd PT / Product Type: Commerical /    In time:8:55  Out time:9:35  Total Treatment Time (min): 40  Visit #: 1 of 8 (singed PN)    Treatment Area: Complete rotator cuff tear or rupture of right shoulder, not specified as traumatic [M75.121]    SUBJECTIVE  Pain Level (0-10 scale): 2  Any medication changes, allergies to medications, adverse drug reactions, diagnosis change, or new procedure performed?: [x] No    [] Yes (see summary sheet for update)  Subjective functional status/changes:   [] No changes reported  Pt reports he feels his shoulder is getting stronger. OBJECTIVE        Min Type Additional Details    [] Estim:  []Unatt       []IFC  []Premod                        []Other:  []w/ice   []w/heat  Position:  Location:    [] Estim: []Att    []TENS instruct  []NMES                    []Other:  []w/US   []w/ice   []w/heat  Position:  Location:    []  Traction: [] Cervical       []Lumbar                       [] Prone          []Supine                       []Intermittent   []Continuous Lbs:  [] before manual  [] after manual    []  Ultrasound: []Continuous   [] Pulsed                           []1MHz   []3MHz W/cm2:  Location:    []  Iontophoresis with dexamethasone         Location: [] Take home patch   [] In clinic    []  Ice     []  heat  []  Ice massage  []  Laser   []  Anodyne Position:  Location:    []  Laser with stim  []  Other:  Position:  Location:    []  Vasopneumatic Device Pressure:       [] lo [] med [] hi   Temperature: [] lo [] med [] hi   [] Skin assessment post-treatment:  []intact []redness- no adverse reaction    []redness - adverse reaction:           9 min Therapeutic Exercise:  [x] See flow sheet :   Rationale: increase ROM and increase strength to improve the patients ability to increase tolerance to activities.           23 min Neuromuscular Re-education:  [x]  See flow sheet :scap stability exercises. Rationale: increase ROM and increase strength  to improve the patients ability to increase ease with overhead activities.      8 min Manual Therapy:  Per flow sheet   Rationale: decrease pain, increase ROM, increase tissue extensibility and decrease trigger points to increase ease with ADLs. .             With   [] TE   [] TA   [] neuro   [] other: Patient Education: [x] Review HEP    [] Progressed/Changed HEP based on:   [] positioning   [] body mechanics   [] transfers   [] heat/ice application    [] other:      Other Objective/Functional Measures: Pt progressing with tolerating more resistances with PNF and scap stabs. Pain Level (0-10 scale) post treatment: 1-2    ASSESSMENT/Changes in Function: Progressed pt to straight arm for body blade. Educated to be aware of posture throughout the day. Patient will continue to benefit from skilled PT services to modify and progress therapeutic interventions, address functional mobility deficits, address ROM deficits, address strength deficits and analyze and address soft tissue restrictions to attain remaining goals. []  See Plan of Care  []  See progress note/recertification  []  See Discharge Summary         Progress towards goals / Updated goals:  Long Term Goals: To be accomplished in 4 weeks:  1. Pt will improve FOTO to > or = to 67 to demo improved function. At PN:  62, an increase of 11 since [facility].  9/11/17  2. Pt will improve R shoulder AROM to pain free for ease w/ dressing. At PN:  flex WNL (pain beging at 140 degrees), Scap WNL (pain free), ER pointer finger to T2 (pain free), IR thumb to L3 (with pain).  9/6/17  3. Pt will increase R shoulder MMT to > or = to 4-4+/5 and pain free grossly for ease w/ shooting a rifle. At PN:  flex 4/5 with pain, Scap 5/5 without pain, ER 4-/5 with pain, IR 4+/5.  9/15/17  4.  Pt will report > or = to 60% improvement in symptoms for ease w/ pain free work tasks. At PN:  50% (in pain control).   9/20/17    PLAN  []  Upgrade activities as tolerated     [x]  Continue plan of care  []  Update interventions per flow sheet       []  Discharge due to:_  []  Other:_      Chase Finley PTA 9/22/2017  8:58 AM    Future Appointments  Date Time Provider Hosea Kong   9/22/2017 9:00 AM Chase Finley PTA [facility]   9/25/2017 9:00 AM Jaycee Amaya PTA [facility]   9/29/2017 10:00 AM Risa Panchal, PT [facility]

## 2017-09-25 ENCOUNTER — HOSPITAL ENCOUNTER (OUTPATIENT)
Dept: PHYSICAL THERAPY | Age: 57
Discharge: HOME | End: 2017-09-25
Payer: COMMERCIAL

## 2017-09-25 PROCEDURE — 97110 THERAPEUTIC EXERCISES: CPT

## 2017-09-25 PROCEDURE — 97140 MANUAL THERAPY 1/> REGIONS: CPT

## 2017-09-25 PROCEDURE — 97112 NEUROMUSCULAR REEDUCATION: CPT

## 2017-09-25 NOTE — PROGRESS NOTES
PT DAILY TREATMENT NOTE     Patient Name: Narcisa Manriquez  Date:2017  : 1960  [x]  Patient  Verified  Payor: Siva Dia / Plan: [employer]  [facility] / Product Type: Commerical /    In time: 9:00  Out time:9:50  Total Treatment Time (min): 50  Visit #: 2 of 8    Treatment Area: Complete rotator cuff tear or rupture of right shoulder, not specified as traumatic [M75.121]    SUBJECTIVE  Pain Level (0-10 scale): 0/10  Any medication changes, allergies to medications, adverse drug reactions, diagnosis change, or new procedure performed?: [x] No    [] Yes (see summary sheet for update)  Subjective functional status/changes:   [] No changes reported  Pt states he has no pain, just stiffness. He reports the MD was pleased with how is shoulder is doing.      OBJECTIVE    Modality rationale:    Min Type Additional Details    [] Estim:  []Unatt       []IFC  []Premod                        []Other:  []w/ice   []w/heat  Position:  Location:    [] Estim: []Att    []TENS instruct  []NMES                    []Other:  []w/US   []w/ice   []w/heat  Position:  Location:    []  Traction: [] Cervical       []Lumbar                       [] Prone          []Supine                       []Intermittent   []Continuous Lbs:  [] before manual  [] after manual    []  Ultrasound: []Continuous   [] Pulsed                           []1MHz   []3MHz W/cm2:  Location:    []  Iontophoresis with dexamethasone         Location: [] Take home patch   [] In clinic    []  Ice     []  heat  []  Ice massage  []  Laser   []  Anodyne Position:  Location:    []  Laser with stim  []  Other:  Position:  Location:    []  Vasopneumatic Device Pressure:       [] lo [] med [] hi   Temperature: [] lo [] med [] hi   [] Skin assessment post-treatment:  []intact []redness- no adverse reaction    []redness - adverse reaction:     19 min Therapeutic Exercise:  [x] See flow sheet :   Rationale: increase ROM and increase strength to improve the patients ability to increase ease of ADLs. 23 min Neuromuscular Re-education:  [x]  See flow sheet : Scap stability per flow sheet   Rationale: increase strength  to improve the patients ability to increase ease of ADLs. 8 min Manual Therapy:  Per flow sheet   Rationale: decrease pain, increase tissue extensibility and increase stability to increase ease of work activitis. With   [] TE   [] TA   [] neuro   [] other: Patient Education: [x] Review HEP    [] Progressed/Changed HEP based on:   [] positioning   [] body mechanics   [] transfers   [] heat/ice application    [] other:      Other Objective/Functional Measures:  AROM flexion reaches 176 degrees when pain begins. Pain Level (0-10 scale) post treatment: 0/10    ASSESSMENT/Changes in Function: AROM is flex is WNL, pt is able to left further before pain begins. Patient will continue to benefit from skilled PT services to modify and progress therapeutic interventions, address functional mobility deficits, address ROM deficits and address strength deficits to attain remaining goals. []  See Plan of Care  []  See progress note/recertification  []  See Discharge Summary         Progress towards goals / Updated goals:  Long Term Goals: To be accomplished in 4 weeks:  1. Pt will improve FOTO to > or = to 67 to demo improved function. At PN:  62, an increase of 11 since [facility].  9/11/17  2. Pt will improve R shoulder AROM to pain free for ease w/ dressing. At PN:  flex WNL (pain beging at 140 degrees), Scap WNL (pain free), ER pointer finger to T2 (pain free), IR thumb to L3 (with pain).  9/6/17  Current: Progressing, flex WNL (176 when pain begins), scap WNL without pain. 9/25/17  3. Pt will increase R shoulder MMT to > or = to 4-4+/5 and pain free grossly for ease w/ shooting a rifle. At PN:  flex 4/5 with pain, Scap 5/5 without pain, ER 4-/5 with pain, IR 4+/5.  9/15/17  4.  Pt will report > or = to 60% improvement in symptoms for ease w/ pain free work tasks.    At PN:  50% (in pain control).  9/20/17    PLAN  []  Upgrade activities as tolerated     [x]  Continue plan of care  []  Update interventions per flow sheet       []  Discharge due to:_  []  Other:_      Jaycee Amaya, PTA 9/25/2017  9:07 AM    Future Appointments  Date Time Provider Hosea Kong   9/29/2017 10:00 AM Leoncio Haynes PT [facility]

## 2017-09-29 ENCOUNTER — HOSPITAL ENCOUNTER (OUTPATIENT)
Dept: PHYSICAL THERAPY | Age: 57
Discharge: HOME | End: 2017-09-29
Payer: COMMERCIAL

## 2017-09-29 PROCEDURE — 97110 THERAPEUTIC EXERCISES: CPT | Performed by: PHYSICAL THERAPIST

## 2017-09-29 PROCEDURE — 97140 MANUAL THERAPY 1/> REGIONS: CPT | Performed by: PHYSICAL THERAPIST

## 2017-09-29 PROCEDURE — 97112 NEUROMUSCULAR REEDUCATION: CPT | Performed by: PHYSICAL THERAPIST

## 2017-09-29 NOTE — PROGRESS NOTES
PT DAILY TREATMENT NOTE     Patient Name: Micah Dunn  Date:2017  : 1960  [x]  Patient  Verified  Payor: Myriam Bates / Plan: [employer]  [facility] / Product Type: Commerical /    In time:1000  Out time:1051  Total Treatment Time (min): 46  Visit #: 3 of 8    Treatment Area: Complete rotator cuff tear or rupture of right shoulder, not specified as traumatic [M75.121]    SUBJECTIVE  Pain Level (0-10 scale): 1/10  Any medication changes, allergies to medications, adverse drug reactions, diagnosis change, or new procedure performed?: [x] No    [] Yes (see summary sheet for update)  Subjective functional status/changes:   [] No changes reported  Pt reports he is very happy with pain control and feels that strength is progressing but not as quickly    OBJECTIVE    Modality rationale: Decrease inflammation and pain to improve the patients ability to improve post workout soreness    Min Type Additional Details    [] Estim:  []Unatt       []IFC  []Premod                        []Other:  []w/ice   []w/heat  Position:  Location:    [] Estim: []Att    []TENS instruct  []NMES                    []Other:  []w/US   []w/ice   []w/heat  Position:  Location:    []  Traction: [] Cervical       []Lumbar                       [] Prone          []Supine                       []Intermittent   []Continuous Lbs:  [] before manual  [] after manual    []  Ultrasound: []Continuous   [] Pulsed                           []1MHz   []3MHz W/cm2:  Location:    []  Iontophoresis with dexamethasone         Location: [] Take home patch   [] In clinic   10 [x]  Ice     []  heat  []  Ice massage  []  Laser   []  Anodyne Position: long sitting  Location: R shoulder    []  Laser with stim  []  Other:  Position:  Location:    []  Vasopneumatic Device Pressure:       [] lo [] med [] hi   Temperature: [] lo [] med [] hi   [] Skin assessment post-treatment:  []intact []redness- no adverse reaction    []redness - adverse reaction:      min []Eval                  []Re-Eval       10 min Therapeutic Exercise:  [x] See flow sheet : increased per flow sheet   Rationale: increase ROM, increase strength and improve coordination to improve the patients ability to decrease pain and improve reaching     min Therapeutic Activity:  []  See flow sheet :   Rationale:   to improve the patients ability to      23 min Neuromuscular Re-education:  [x]  See flow sheet :Scap stability per flow sheet   Rationale: increase strength, improve coordination and increase proprioception  to improve the patients ability to decrease pain and improve activity tolerance    8 min Manual Therapy:  Rhythmic stabs and PNF D1/D2   Rationale: decrease pain, increase ROM, increase tissue extensibility, decrease trigger points and increase postural awareness to improve activity tolerance and scapular activation      min Gait Training:  ___ feet with ___ device on level surfaces with ___ level of assist   Rationale: With   [] TE   [] TA   [] neuro   [] other: Patient Education: [x] Review HEP    [] Progressed/Changed HEP based on:   [] positioning   [] body mechanics   [] transfers   [] heat/ice application    [] other:      Other Objective/Functional Measures:      Pain Level (0-10 scale) post treatment: 0/10    ASSESSMENT/Changes in Function: Pt is progressing well, will schedule 5 more visits to improve further upon strengthening. Patient will continue to benefit from skilled PT services to modify and progress therapeutic interventions, address functional mobility deficits, address ROM deficits, address strength deficits, analyze and address soft tissue restrictions, analyze and cue movement patterns, analyze and modify body mechanics/ergonomics, assess and modify postural abnormalities and instruct in home and community integration to attain remaining goals.      []  See Plan of Care  []  See progress note/recertification  []  See Discharge Summary         Progress towards goals / Updated goals:  Long Term Goals: To be accomplished in 4 weeks:  1. Pt will improve FOTO to > or = to 67 to demo improved function. At PN:  57, an increase of 11 since [facility].  9/11/17  2. Pt will improve R shoulder AROM to pain free for ease w/ dressing. At PN:  flex WNL (pain beging at 140 degrees), Scap WNL (pain free), ER pointer finger to T2 (pain free), IR thumb to L3 (with pain).  9/6/17  Current: Progressing, flex WNL (176 when pain begins), scap WNL without pain. 9/25/17  3. Pt will increase R shoulder MMT to > or = to 4-4+/5 and pain free grossly for ease w/ shooting a rifle. At PN:  flex 4/5 with pain, Scap 5/5 without pain, ER 4-/5 with pain, IR 4+/5.  9/15/17  4. Pt will report > or = to 60% improvement in symptoms for ease w/ pain free work tasks.    At PN:  50% (in pain control).  9/20/17  Current: progressing, reports 90% improvement in pain control but much less in strength 9/29/17     PLAN  [x]  Upgrade activities as tolerated     [x]  Continue plan of care  []  Update interventions per flow sheet       []  Discharge due to:_  []  Other:_      Anastacia aWre, PT 9/29/2017  10:19 AM    Future Appointments  Date Time Provider Hosea Kong   10/2/2017 8:00 AM Jaycee Amaya PTA [facility]

## 2017-10-02 ENCOUNTER — HOSPITAL ENCOUNTER (OUTPATIENT)
Dept: PHYSICAL THERAPY | Age: 57
Discharge: HOME | End: 2017-10-02
Payer: COMMERCIAL

## 2017-10-02 PROCEDURE — 97140 MANUAL THERAPY 1/> REGIONS: CPT

## 2017-10-02 PROCEDURE — 97112 NEUROMUSCULAR REEDUCATION: CPT

## 2017-10-02 PROCEDURE — 97110 THERAPEUTIC EXERCISES: CPT

## 2017-10-02 NOTE — PROGRESS NOTES
PT DAILY TREATMENT NOTE     Patient Name: Arik Morfin  Date:10/2/2017  : 1960  [x]  Patient  Verified  Payor: Simran Stallworth / Plan: [facility] [employer]  CAPITATED PT / Product Type: Commerical /    In time: 7:58  Out time:8:50  Total Treatment Time (min): 52  Visit #: 4 of 8    Treatment Area: Complete rotator cuff tear or rupture of right shoulder, not specified as traumatic [M75.121]    SUBJECTIVE  Pain Level (0-10 scale): 1/10  Any medication changes, allergies to medications, adverse drug reactions, diagnosis change, or new procedure performed?: [x] No    [] Yes (see summary sheet for update)  Subjective functional status/changes:   [] No changes reported  Pt states he is feeling good. OBJECTIVE    Modality rationale: decrease pain to improve the patients ability to increase ease of work activity.     Min Type Additional Details    [] Estim:  []Unatt       []IFC  []Premod                        []Other:  []w/ice   []w/heat  Position:  Location:    [] Estim: []Att    []TENS instruct  []NMES                    []Other:  []w/US   []w/ice   []w/heat  Position:  Location:    []  Traction: [] Cervical       []Lumbar                       [] Prone          []Supine                       []Intermittent   []Continuous Lbs:  [] before manual  [] after manual    []  Ultrasound: []Continuous   [] Pulsed                           []1MHz   []3MHz W/cm2:  Location:    []  Iontophoresis with dexamethasone         Location: [] Take home patch   [] In clinic   10 [x]  Ice     []  heat  []  Ice massage  []  Laser   []  Anodyne Position: seated  Location: R shoulder    []  Laser with stim  []  Other:  Position:  Location:    []  Vasopneumatic Device Pressure:       [] lo [] med [] hi   Temperature: [] lo [] med [] hi   [] Skin assessment post-treatment:  []intact []redness- no adverse reaction    []redness - adverse reaction:     11 min Therapeutic Exercise:  [x] See flow sheet :   Rationale: increase ROM and increase strength to improve the patients ability to increase ease of ADLs. 23 min Neuromuscular Re-education:  [x]  See flow sheet : scap stabs per flow sheet   Rationale: increase strength  to improve the patients ability to increase ease of ADLs. 8 min Manual Therapy:  Rhythmic stabs and PNF. Rationale: decrease pain, increase ROM and increase tissue extensibility to increase ease of ADLs. With   [] TE   [] TA   [] neuro   [] other: Patient Education: [x] Review HEP    [] Progressed/Changed HEP based on:   [] positioning   [] body mechanics   [] transfers   [] heat/ice application    [] other:      Other Objective/Functional Measures:  R UE MMT: flex and scap 4/5 with pain, ER 4-/5 with pain, IR 5/5 without pain. Pain Level (0-10 scale) post treatment: 1/10    ASSESSMENT/Changes in Function: Pt reports pain with all MMT planes with the exception of IR. Patient will continue to benefit from skilled PT services to modify and progress therapeutic interventions, address functional mobility deficits, address ROM deficits and address strength deficits to attain remaining goals. []  See Plan of Care  []  See progress note/recertification  []  See Discharge Summary         Progress towards goals / Updated goals:  Long Term Goals: To be accomplished in 4 weeks:  1. Pt will improve FOTO to > or = to 67 to demo improved function. At PN:  57, an increase of 11 since [facility].  9/11/17  2. Pt will improve R shoulder AROM to pain free for ease w/ dressing. At PN:  flex WNL (pain beging at 140 degrees), Scap WNL (pain free), ER pointer finger to T2 (pain free), IR thumb to L3 (with pain).  9/6/17  Current: Progressing, flex WNL (176 when pain begins), scap WNL without pain.  9/25/17  3. Pt will increase R shoulder MMT to > or = to 4-4+/5 and pain free grossly for ease w/ shooting a rifle.    At PN:  flex 4/5 with pain, Scap 5/5 without pain, ER 4-/5 with pain, IR 4+/5.  9/15/17  Current: Not met, flex and scap 4/5 with pain, ER 4-/5 with pain, IR 5/5 without pain. 10/2/17   4. Pt will report > or = to 60% improvement in symptoms for ease w/ pain free work tasks.    At PN:  50% (in pain control).  9/20/17  Current: progressing, reports 90% improvement in pain control but much less in strength 9/29/17     PLAN  []  Upgrade activities as tolerated     [x]  Continue plan of care  []  Update interventions per flow sheet       []  Discharge due to:_  []  Other:_      Jaycee Amaya PTA 10/2/2017  9:27 AM    Future Appointments  Date Time Provider Hosea Kong   10/6/2017 8:00 AM Ricki Garcia, PT [facility]   10/9/2017 8:30 AM Michael Richter PTA [facility]   10/13/2017 8:30 AM Michael Richter PTA [facility]   10/19/2017 8:00 AM Jaycee Amaya PTA [facility]   10/23/2017 8:00 AM MARIA NIES Rivas [facility]

## 2017-10-06 ENCOUNTER — HOSPITAL ENCOUNTER (OUTPATIENT)
Dept: PHYSICAL THERAPY | Age: 57
Discharge: HOME | End: 2017-10-06
Payer: COMMERCIAL

## 2017-10-06 PROCEDURE — 97112 NEUROMUSCULAR REEDUCATION: CPT | Performed by: PHYSICAL THERAPIST

## 2017-10-06 PROCEDURE — 97110 THERAPEUTIC EXERCISES: CPT | Performed by: PHYSICAL THERAPIST

## 2017-10-06 PROCEDURE — 97140 MANUAL THERAPY 1/> REGIONS: CPT | Performed by: PHYSICAL THERAPIST

## 2017-10-06 NOTE — PROGRESS NOTES
PT DAILY TREATMENT NOTE     Patient Name: Krissy Kauffman  Date:10/6/2017  : 1960  [x]  Patient  Verified  Payor: Ten Bui / Plan: [facility] [employer]  CAPITATED PT / Product Type: Commerical /    In time:758  Out time:849  Total Treatment Time (min): 51  Visit #: 5 of 8    Treatment Area: Complete rotator cuff tear or rupture of right shoulder, not specified as traumatic [M75.121]    SUBJECTIVE  Pain Level (0-10 scale): 0/10  Any medication changes, allergies to medications, adverse drug reactions, diagnosis change, or new procedure performed?: [x] No    [] Yes (see summary sheet for update)  Subjective functional status/changes:   [] No changes reported  Pt reports he can feel that he is getting stronger    OBJECTIVE    Modality rationale: decrease edema, decrease inflammation and decrease pain to improve the patients ability to decrease post workout soreness   Min Type Additional Details    [] Estim:  []Unatt       []IFC  []Premod                        []Other:  []w/ice   []w/heat  Position:  Location:    [] Estim: []Att    []TENS instruct  []NMES                    []Other:  []w/US   []w/ice   []w/heat  Position:  Location:    []  Traction: [] Cervical       []Lumbar                       [] Prone          []Supine                       []Intermittent   []Continuous Lbs:  [] before manual  [] after manual    []  Ultrasound: []Continuous   [] Pulsed                           []1MHz   []3MHz W/cm2:  Location:    []  Iontophoresis with dexamethasone         Location: [] Take home patch   [] In clinic   10 [x]  Ice     []  heat  []  Ice massage  []  Laser   []  Anodyne Position: long sitting  Location: R shoulder     []  Laser with stim  []  Other:  Position:  Location:    []  Vasopneumatic Device Pressure:       [] lo [] med [] hi   Temperature: [] lo [] med [] hi   [] Skin assessment post-treatment:  []intact []redness- no adverse reaction    []redness - adverse reaction:      min []Eval []Re-Eval       12 min Therapeutic Exercise:  [x] See flow sheet : increased per flow sheet   Rationale: increase ROM, increase strength and improve coordination to improve the patients ability to decrease pain and improve activity tolerance     min Therapeutic Activity:  []  See flow sheet :   Rationale:   to improve the patients ability to      21 min Neuromuscular Re-education:  [x]  See flow sheet :Scap stability per flow sheet   Rationale: increase strength, improve coordination and increase proprioception  to improve the patients ability to decrease pain and improve activity tolerance     8 min Manual Therapy:  Rhythmic stabs and PNF D1/D2   Rationale: decrease pain, increase ROM and increase tissue extensibility to improve activity tolerance and function     min Gait Training:  ___ feet with ___ device on level surfaces with ___ level of assist   Rationale: With   [] TE   [] TA   [] neuro   [] other: Patient Education: [x] Review HEP    [] Progressed/Changed HEP based on:   [] positioning   [] body mechanics   [] transfers   [] heat/ice application    [] other:      Other Objective/Functional Measures:      Pain Level (0-10 scale) post treatment: 0/10    ASSESSMENT/Changes in Function: Progressing well and improving w/ scapular stability. Continue to progress as tolerated. Patient will continue to benefit from skilled PT services to modify and progress therapeutic interventions, address functional mobility deficits, address strength deficits, analyze and address soft tissue restrictions, analyze and cue movement patterns, analyze and modify body mechanics/ergonomics and instruct in home and community integration to attain remaining goals. []  See Plan of Care  []  See progress note/recertification  []  See Discharge Summary         Progress towards goals / Updated goals:  Long Term Goals: To be accomplished in 4 weeks:  1. Pt will improve FOTO to > or = to 67 to demo improved function. At PN:  57, an increase of 11 since [facility].  9/11/17  Current: met, FOTO = 75 10/6/17  2. Pt will improve R shoulder AROM to pain free for ease w/ dressing. At PN:  flex WNL (pain beging at 140 degrees), Scap WNL (pain free), ER pointer finger to T2 (pain free), IR thumb to L3 (with pain).  9/6/17  Current: Progressing, flex WNL (176 when pain begins), scap WNL without pain.  9/25/17  3. Pt will increase R shoulder MMT to > or = to 4-4+/5 and pain free grossly for ease w/ shooting a rifle. At PN:  flex 4/5 with pain, Scap 5/5 without pain, ER 4-/5 with pain, IR 4+/5.  9/15/17  Current: Not met, flex and scap 4/5 with pain, ER 4-/5 with pain, IR 5/5 without pain. 10/2/17   4. Pt will report > or = to 60% improvement in symptoms for ease w/ pain free work tasks.    At PN:  50% (in pain control).  9/20/17  Current: progressing, reports 90% improvement in pain control but much less in strength 9/29/17     PLAN  [x]  Upgrade activities as tolerated     [x]  Continue plan of care  []  Update interventions per flow sheet       []  Discharge due to:_  []  Other:_      Cruzito Mckay, PT 10/6/2017  8:05 AM    Future Appointments  Date Time Provider Hosea Kong   10/9/2017 8:30 AM MARIA INES Craven [facility]   10/13/2017 8:30 AM MARIA INES Craven [facility]   10/19/2017 8:00 AM MARIA INES Rivas [facility]   10/23/2017 8:00 AM MARIA INES Rivas [facility]

## 2017-10-09 ENCOUNTER — APPOINTMENT (OUTPATIENT)
Dept: PHYSICAL THERAPY | Age: 57
End: 2017-10-09
Payer: COMMERCIAL

## 2017-10-13 ENCOUNTER — HOSPITAL ENCOUNTER (OUTPATIENT)
Dept: PHYSICAL THERAPY | Age: 57
Discharge: HOME | End: 2017-10-13
Payer: COMMERCIAL

## 2017-10-13 PROCEDURE — 97110 THERAPEUTIC EXERCISES: CPT

## 2017-10-13 PROCEDURE — 97140 MANUAL THERAPY 1/> REGIONS: CPT

## 2017-10-13 NOTE — PROGRESS NOTES
PT DAILY TREATMENT NOTE     Patient Name: Idalia Frank  Date:10/13/2017  : 1960  [x]  Patient  Verified  Payor: Luke Ordonez / Plan: [address] [place] [address] Rd PT / Product Type: Commerical /    In time:8:24  Out time:9:22  Total Treatment Time (min): 58  Visit #: 6 of 8    Treatment Area: Complete rotator cuff tear or rupture of right shoulder, not specified as traumatic [M75.121]    SUBJECTIVE  Pain Level (0-10 scale): 2  Any medication changes, allergies to medications, adverse drug reactions, diagnosis change, or new procedure performed?: [x] No    [] Yes (see summary sheet for update)  Subjective functional status/changes:   [] No changes reported  Pt reports he is unsure on why is pain is more today, other than his missed his session earlier this week. OBJECTIVE    Modality rationale: decrease pain to improve the patients ability to decrease difficulty while performing tasks.     Min Type Additional Details    [] Estim:  []Unatt       []IFC  []Premod                        []Other:  []w/ice   []w/heat  Position:  Location:    [] Estim: []Att    []TENS instruct  []NMES                    []Other:  []w/US   []w/ice   []w/heat  Position:  Location:    []  Traction: [] Cervical       []Lumbar                       [] Prone          []Supine                       []Intermittent   []Continuous Lbs:  [] before manual  [] after manual    []  Ultrasound: []Continuous   [] Pulsed                           []1MHz   []3MHz W/cm2:  Location:    []  Iontophoresis with dexamethasone         Location: [] Take home patch   [] In clinic   10 [x]  Ice     []  heat  []  Ice massage  []  Laser   []  Anodyne Position:sitting  Location:R shoulder    []  Laser with stim  []  Other:  Position:  Location:    []  Vasopneumatic Device Pressure:       [] lo [] med [] hi   Temperature: [] lo [] med [] hi   [] Skin assessment post-treatment:  []intact []redness- no adverse reaction    []redness - adverse reaction:       38 min Therapeutic Exercise:  [x] See flow sheet :   Rationale: increase ROM and increase strength to improve the patients ability to increase tolerance to activities. 10 min Manual Therapy:   Rhythmic stabs and PNF D1/D2   Rationale: decrease pain, increase ROM and increase tissue extensibility to increase ease with work activities. With   [] TE   [] TA   [] neuro   [] other: Patient Education: [x] Review HEP    [] Progressed/Changed HEP based on:   [] positioning   [] body mechanics   [] transfers   [] heat/ice application    [] other:      Other Objective/Functional Measures:  reports 90% improvement in pain control but 60% in strength     Pain Level (0-10 scale) post treatment: 0    ASSESSMENT/Changes in Function: Pt tolerates exercises well. Pt has less fatigue after performing exercises. Patient will continue to benefit from skilled PT services to modify and progress therapeutic interventions, address functional mobility deficits, address ROM deficits, address strength deficits and analyze and address soft tissue restrictions to attain remaining goals. []  See Plan of Care  []  See progress note/recertification  []  See Discharge Summary         Progress towards goals / Updated goals:  Long Term Goals: To be accomplished in 4 weeks:  1. Pt will improve FOTO to > or = to 67 to demo improved function. At PN:  57, an increase of 11 since [facility].  9/11/17  Current: met, FOTO = 75 10/6/17  2. Pt will improve R shoulder AROM to pain free for ease w/ dressing. At PN:  flex WNL (pain beging at 140 degrees), Scap WNL (pain free), ER pointer finger to T2 (pain free), IR thumb to L3 (with pain).  9/6/17  Current: Progressing, flex WNL (176 when pain begins), scap WNL without pain.  9/25/17  3. Pt will increase R shoulder MMT to > or = to 4-4+/5 and pain free grossly for ease w/ shooting a rifle.    At PN:  flex 4/5 with pain, Scap 5/5 without pain, ER 4-/5 with pain, IR 4+/5.  9/15/17  Current: Not met, flex and scap 4/5 with pain, ER 4-/5 with pain, IR 5/5 without pain.  10/2/17   4. Pt will report > or = to 60% improvement in symptoms for ease w/ pain free work tasks.    At PN:  50% (in pain control).  9/20/17  Current: Goal met: , reports 90% improvement in pain control but 60% in strength 10/13/17     PLAN  []  Upgrade activities as tolerated     [x]  Continue plan of care  []  Update interventions per flow sheet       []  Discharge due to:_  []  Other:_      Pau Head PTA 10/13/2017  8:25 AM    Future Appointments  Date Time Provider Hosea Kong   10/13/2017 8:30 AM [facility]   10/19/2017 8:00 AM MARIA INES Rivas [facility]   10/23/2017 8:00 AM MARIA INES [facility]

## 2017-10-18 ENCOUNTER — HOSPITAL ENCOUNTER (OUTPATIENT)
Dept: PHYSICAL THERAPY | Age: 57
Discharge: HOME | End: 2017-10-18
Payer: COMMERCIAL

## 2017-10-18 PROCEDURE — 97140 MANUAL THERAPY 1/> REGIONS: CPT

## 2017-10-18 PROCEDURE — 97110 THERAPEUTIC EXERCISES: CPT

## 2017-10-18 NOTE — PROGRESS NOTES
PT DAILY TREATMENT NOTE     Patient Name: Eliana Agudelo  Date:10/18/2017  : 1960  [x]  Patient  Verified  Payor: Maddie Comment / Plan: [address] PT / Product Type: Commerical /    In time: 7:31  Out time: 8:22  Total Treatment Time (min): 51  Visit #: 7 of 8    Treatment Area: Complete rotator cuff tear or rupture of right shoulder, not specified as traumatic [M75.121]    SUBJECTIVE  Pain Level (0-10 scale): Any medication changes, allergies to medications, adverse drug reactions, diagnosis change, or new procedure performed?: [x] No    [] Yes (see summary sheet for update)  Subjective functional status/changes:   [] No changes reported  Pt states PT seems to manage his pain. He feels increased soreness when he does not have PT for a few days.     OBJECTIVE    Modality rationale: decrease pain to improve the patients ability to perform work acts   Min Type Additional Details    [] Estim:  []Unatt       []IFC  []Premod                        []Other:  []w/ice   []w/heat  Position:  Location:    [] Estim: []Att    []TENS instruct  []NMES                    []Other:  []w/US   []w/ice   []w/heat  Position:  Location:    []  Traction: [] Cervical       []Lumbar                       [] Prone          []Supine                       []Intermittent   []Continuous Lbs:  [] before manual  [] after manual    []  Ultrasound: []Continuous   [] Pulsed                           []1MHz   []3MHz W/cm2:  Location:    []  Iontophoresis with dexamethasone         Location: [] Take home patch   [] In clinic   10 [x]  Ice     []  heat  []  Ice massage  []  Laser   []  Anodyne Position: seated  Location: R shoulder    []  Laser with stim  []  Other:  Position:  Location:    []  Vasopneumatic Device Pressure:       [] lo [] med [] hi   Temperature: [] lo [] med [] hi   [] Skin assessment post-treatment:  []intact []redness- no adverse reaction    []redness - adverse reaction:     33 min Therapeutic Exercise:  [x] See flow sheet :   Rationale: increase ROM and increase strength to improve the patients ability to perform ADLs. 8 min Manual Therapy:   Rhythmic stabs at 60, 90 and 120, PNF D1/D2   Rationale: decrease pain, increase ROM and increase tissue extensibility to increase ease of ADLs. With   [] TE   [] TA   [] neuro   [] other: Patient Education: [x] Review HEP    [] Progressed/Changed HEP based on:   [] positioning   [] body mechanics   [] transfers   [] heat/ice application    [] other:      Other Objective/Functional Measures: IR thumb reaches to T12 without increased pain. Flex, scap and IR WNL without pain. Pain Level (0-10 scale) post treatment: 0/10    ASSESSMENT/Changes in Function: Pt is progressing as AROM is WNL without increased pain. Patient will continue to benefit from skilled PT services to modify and progress therapeutic interventions, address functional mobility deficits, address ROM deficits and address strength deficits to attain remaining goals. []  See Plan of Care  []  See progress note/recertification  []  See Discharge Summary         Progress towards goals / Updated goals:  Long Term Goals: To be accomplished in 4 weeks:  1. Pt will improve FOTO to > or = to 67 to demo improved function. At PN:  57, an increase of 11 since [facility].  9/11/17  Current: met, FOTO = 75 10/6/17  2. Pt will improve R shoulder AROM to pain free for ease w/ dressing. At PN:  flex WNL (pain beging at 140 degrees), Scap WNL (pain free), ER pointer finger to T2 (pain free), IR thumb to L3 (with pain).  9/6/17  Current: Met, AROM WNL without increased pain.    10/18//17  3. Pt will increase R shoulder MMT to > or = to 4-4+/5 and pain free grossly for ease w/ shooting a rifle. At PN:  flex 4/5 with pain, Scap 5/5 without pain, ER 4-/5 with pain, IR 4+/5.  9/15/17  Current: Not met, flex and scap 4/5 with pain, ER 4-/5 with pain, IR 5/5 without pain.  10/2/17   4.  Pt will report > or = to 60% improvement in symptoms for ease w/ pain free work tasks.    At PN:  50% (in pain control).  9/20/17  Current: Goal met: , reports 90% improvement in pain control but 60% in strength 10/13/17     PLAN  []  Upgrade activities as tolerated     [x]  Continue plan of care  []  Update interventions per flow sheet       []  Discharge due to:_  []  Other:_      Jaycee Amaya PTA 10/18/2017  7:43 AM    Future Appointments  Date Time Provider Hosea Kong   10/23/2017 8:00 AM Jaycee Amaya PTA [facility]

## 2017-10-19 ENCOUNTER — APPOINTMENT (OUTPATIENT)
Dept: PHYSICAL THERAPY | Age: 57
End: 2017-10-19
Payer: COMMERCIAL

## 2017-10-23 ENCOUNTER — HOSPITAL ENCOUNTER (OUTPATIENT)
Dept: PHYSICAL THERAPY | Age: 57
Discharge: HOME | End: 2017-10-23
Payer: COMMERCIAL

## 2017-10-23 PROCEDURE — 97110 THERAPEUTIC EXERCISES: CPT

## 2017-10-23 NOTE — PROGRESS NOTES
PT DAILY TREATMENT NOTE     Patient Name: Valentin Carcamo  Date:10/23/2017  : 1960  [x]  Patient  Verified  Payor: Albino Sandoval / Plan: [facility] [employer]  CAPITATED PT / Product Type: Commerical /    In time: 8:05  Out time: 8:59  Total Treatment Time (min): 54  Visit #: 8 of 8    Treatment Area: Complete rotator cuff tear or rupture of right shoulder, not specified as traumatic [M75.121]    SUBJECTIVE  Pain Level (0-10 scale): 1/10  Any medication changes, allergies to medications, adverse drug reactions, diagnosis change, or new procedure performed?: [x] No    [] Yes (see summary sheet for update)  Subjective functional status/changes:   [] No changes reported  Pt states he is a little sore today    OBJECTIVE    Modality rationale: decrease pain to improve the patients ability to increase ease of ADLs.     Min Type Additional Details    [] Estim:  []Unatt       []IFC  []Premod                        []Other:  []w/ice   []w/heat  Position:  Location:    [] Estim: []Att    []TENS instruct  []NMES                    []Other:  []w/US   []w/ice   []w/heat  Position:  Location:    []  Traction: [] Cervical       []Lumbar                       [] Prone          []Supine                       []Intermittent   []Continuous Lbs:  [] before manual  [] after manual    []  Ultrasound: []Continuous   [] Pulsed                           []1MHz   []3MHz W/cm2:  Location:    []  Iontophoresis with dexamethasone         Location: [] Take home patch   [] In clinic   10 [x]  Ice     []  heat  []  Ice massage  []  Laser   []  Anodyne Position:  reclined  Location: R shoulder    []  Laser with stim  []  Other:  Position:  Location:    []  Vasopneumatic Device Pressure:       [] lo [] med [] hi   Temperature: [] lo [] med [] hi   [] Skin assessment post-treatment:  []intact []redness- no adverse reaction    []redness - adverse reaction:     44 min Therapeutic Exercise:  [x] See flow sheet :   Rationale: increase ROM and increase strength to improve the patients ability to perform ADLs. With   [] TE   [] TA   [] neuro   [] other: Patient Education: [x] Review HEP    [] Progressed/Changed HEP based on:   [] positioning   [] body mechanics   [] transfers   [] heat/ice application    [] other:      Other Objective/Functional Measures: ERMMT 4-/5 with pain. Pain Level (0-10 scale) post treatment: 0/10  ,   ASSESSMENT/Changes in Function: Pt has met most goals. FOTO score is 75, an increase of 29 since [facility]. Pt reports a 90% improvement in pain and a 60% improvement in strength. AROM in WNL without increased pain. Strength has increased and is as follows:  Flex and scap 4/5, IR 5/5, ER 4-/5. All planes are painful. Average pain reported in PT 0/10 to 1/10. []  See Plan of Care  []  See progress note/recertification  [x]  See Discharge Summary         Progress towards goals / Updated goals:  Long Term Goals: To be accomplished in 4 weeks:  1. Pt will improve FOTO to > or = to 67 to demo improved function. At PN:  57, an increase of 11 since [facility].  9/11/17  Current: met, FOTO = 75 10/6/17  2. Pt will improve R shoulder AROM to pain free for ease w/ dressing. At PN:  flex WNL (pain beging at 140 degrees), Scap WNL (pain free), ER pointer finger to T2 (pain free), IR thumb to L3 (with pain).  9/6/17  Current: Met, AROM WNL without increased pain.    10/18//17  3. Pt will increase R shoulder MMT to > or = to 4-4+/5 and pain free grossly for ease w/ shooting a rifle. At PN:  flex 4/5 with pain, Scap 5/5 without pain, ER 4-/5 with pain, IR 4+/5.  9/15/17  Current: Partially met, flex and scap 4/5 with pain, IR 5/5 without pain. ER 4-/5 (with pain) 10/23/17  4. Pt will report > or = to 60% improvement in symptoms for ease w/ pain free work tasks.    At PN:  50% (in pain control).  9/20/17  Current: Goal met: , reports 90% improvement in pain control but 60% in strength 10/13/17     PLAN  []  Upgrade activities as tolerated []  Continue plan of care  []  Update interventions per flow sheet       [x]  Discharge due to:  []  Other:_      Jaycee Amaya, PTA 10/23/2017  8:09 AM    No future appointments.

## 2017-10-31 NOTE — PROGRESS NOTES
[facility] - [facility]              [address] [facility]        [place], [address]   [phone] fax    Discharge Summary  Patient name: Debora Whittington Start of Care: 17   Referral source: Nancy Fraser MD : 1960   Medical/Treatment Diagnosis: Complete rotator cuff tear or rupture of right shoulder, not specified as traumatic [M75.121] Onset Date:3 months prior to initial visit     Prior Hospitalization: see medical history Provider#: [id number]   Medications: Verified on Patient Summary List    Comorbidities:  Arthritis, back pain, BMI > 30, HBP, kidney, bladder, prostate, or urination problems   Prior Level of Function: independent w/ ADLs and pain free with work tasks, no R shoulder pain, pt is R hand dominant  Visits from Start of Care: 16    Missed Visits: 1  Reporting Period : 17 to 10/23/17    Summary of Care:  Progress towards goals / Updated goals:  Long Term Goals: To be accomplished in 4 weeks:  1. Pt will improve FOTO to > or = to 67 to demo improved function. At PN:  57, an increase of 11 since [facility]  Current: met, FOTO = 75  2. Pt will improve R shoulder AROM to pain free for ease w/ dressing. At PN:  flex WNL (pain beging at 140 degrees), Scap WNL (pain free), ER pointer finger to T2 (pain free), IR thumb to L3 (with pain)  Current: Met, AROM WNL without increased pain  3. Pt will increase R shoulder MMT to > or = to 4-4+/5 and pain free grossly for ease w/ shooting a rifle. At PN:  flex 4/5 with pain, Scap 5/5 without pain, ER 4-/5 with pain, IR 4+/5  Current: Partially met, flex and scap 4/5 with pain, IR 5/5 without pain ER 4-/5 (with pain)  4. Pt will report > or = to 60% improvement in symptoms for ease w/ pain free work tasks.    At PN:  50% (in pain control)  Current: Goal met: reports 90% improvement in pain control but 60% in strength      Pt has met most goals and reports a 90% improvement in pain and a 60% improvement in strength. FOTO score is 75, an increase of 29 since [facility]. AROM in WNL without increased pain. Strength has increased and is as follows: Flex and scap 4/5, IR 5/5, ER 4-/5. All planes are painful. Average pain reported in PT 0/10 to 1/10. DC to HEP at this time.      ASSESSMENT/RECOMMENDATIONS:  [x]Discontinue therapy: [x]Patient has reached or is progressing toward set goals      []Patient is non-compliant or has abdicated      []Due to lack of appreciable progress towards set Jeff Spencer, PT 10/31/2017 9:50 AM

## 2018-04-05 PROBLEM — Z87.448 HISTORY OF HEMATURIA: Status: ACTIVE | Noted: 2018-04-05

## 2018-04-05 PROBLEM — E66.9 OBESITY (BMI 30.0-34.9): Status: ACTIVE | Noted: 2018-04-05

## 2018-04-05 PROBLEM — N13.8 BPH WITH OBSTRUCTION/LOWER URINARY TRACT SYMPTOMS: Status: ACTIVE | Noted: 2018-04-05

## 2018-04-05 PROBLEM — Z87.442 HISTORY OF NEPHROLITHIASIS: Status: ACTIVE | Noted: 2018-04-05

## 2018-04-05 PROBLEM — N40.1 BPH WITH OBSTRUCTION/LOWER URINARY TRACT SYMPTOMS: Status: ACTIVE | Noted: 2018-04-05

## 2018-08-08 ENCOUNTER — OFFICE VISIT (OUTPATIENT)
Dept: ORTHOPEDIC SURGERY | Age: 58
End: 2018-08-08

## 2018-08-08 VITALS
HEIGHT: 72 IN | WEIGHT: 251 LBS | HEART RATE: 72 BPM | BODY MASS INDEX: 34 KG/M2 | DIASTOLIC BLOOD PRESSURE: 85 MMHG | SYSTOLIC BLOOD PRESSURE: 128 MMHG

## 2018-08-08 DIAGNOSIS — M51.36 DDD (DEGENERATIVE DISC DISEASE), LUMBAR: ICD-10-CM

## 2018-08-08 DIAGNOSIS — M54.16 LUMBAR NEURITIS: Primary | ICD-10-CM

## 2018-08-08 DIAGNOSIS — M47.817 LUMBOSACRAL SPONDYLOSIS WITHOUT MYELOPATHY: ICD-10-CM

## 2018-08-08 NOTE — MR AVS SNAPSHOT
[address] [facility] 
 
 
 [address] [facility] 
[phone] Patient: Mian Kimball MRN: [id number] Shavon Orantes Visit Information Date & Time Provider Department Dept. Phone Encounter #  
 8/8/2018 10:00 AM Keyur Gibbons MD [facility] [place] [phone] Follow-up Instructions Return if symptoms worsen or fail to improve. 8/28/2018  9:45 AM  
Any with Mitali Rodriguez MD  
[facility] Appt Note: Return in about 6 weeks (around 8/28/2018) for follow up, with MD, review PHI. [address] [facility] [address] [address] [address] [facility]  
  
   
 [address] [facility] [address] [facility] [address] [facility] Upcoming Health Maintenance Date Due Hepatitis C Screening 1960 DTaP/Tdap/Td series (1 - Tdap) 8/7/1982 FOBT Q 1 YEAR AGE 50-75 8/7/2011 Influenza Age 5 to Adult 8/1/2018 Allergies as of 8/8/2018  Review Complete On: 8/8/2018 By: Keyur Gibbons MD  
 No Known Allergies Current Immunizations  Never Reviewed No immunizations on file. Not reviewed this visit Vitals BP Pulse Height(growth percentile) Weight(growth percentile) BMI Smoking Status 128/85 72 6' (1.829 m) 251 lb (113.9 kg) 34.04 kg/m2 Never Smoker Vitals History BMI and BSA Data Body Mass Index Body Surface Area 34.04 kg/m 2 2.41 m 2 Preferred Pharmacy Pharmacy Name Phone [id number] Dawit [place]iels, [address] [phone] Your Updated Medication List  
  
   
This list is accurate as of 8/8/18 11:07 AM.  Always use your most recent med list.  
  
  
  
  
 lisinopril 20 mg tablet Commonly known as:  Nazia Fernandez Take  by mouth daily. Follow-up Instructions Return if symptoms worsen or fail to improve. Introducing [facility] HE[facility]! [employer] introduces [employer] patient portal. Now you can access parts of your medical record, email your doctor's office, and request medication refills online. 1. In your internet browser, go to [email or web address] 2. Click on the First Time User? Click Here link in the Sign In box. You will see the New Member Sign Up page. 3. Enter your [employer] Access Code exactly as it appears below. You will not need to use this code after youve completed the sign-up process. If you do not sign up before the expiration date, you must request a new code. · [employer] Access Code: [email or web address] Expires: 10/15/2018 11:30 AM 
 
4. Enter the last four digits of your Social Security Number (xxxx) and Date of Birth (mm/dd/yyyy) as indicated and click Submit. You will be taken to the next sign-up page. 5. Create a [employer] ID. This will be your [employer] login ID and cannot be changed, so think of one that is secure and easy to remember. 6. Create a [employer] password. You can change your password at any time. 7. Enter your Password Reset Question and Answer. This can be used at a later time if you forget your password. 8. Enter your e-mail address. You will receive e-mail notification when new information is available in [address] 9. Click Sign Up. You can now view and download portions of your medical record. 10. Click the Download Summary menu link to download a portable copy of your medical information. If you have questions, please visit the Frequently Asked Questions section of the [employer] website. Remember, [employer] is NOT to be used for urgent needs. For medical emergencies, dial 911. Now available from your iPhone and Android! Please provide this summary of care documentation to your next provider. Your primary care clinician is listed as Minor Emperor. If you have any questions after today's visit, please call [phone].

## 2018-08-08 NOTE — PROGRESS NOTES
[facility]  [address], [address]  Phone: [phone]  Fax: [phone]        INITIAL CONSULTATION      HISTORY OF PRESENT ILLNESS:  Idalia Frank is a 63 y.o. male whom is self-referred secondary to low back pain radiating into the right buttock. He additionally endorses distal RLE pain and paraestheisas extending medially from the knee to the ankle x a few months. He initially had low back pain radiating into the RLE medially from the groin to the ankle. He rates his pain 0-4/10. His primary complaint at this time is paraesthesias. He denies specific injury or trauma. He denies hx of surgery, injections, or PT. Pt tried MDP with some relief. Pt denies fever, weight loss, or skin changes. Pt denies change in bowel or bladder habits. The patient is RHD.  reviewed. Body mass index is 34.04 kg/(m^2). PCP: Veronica Reyes MD    Past Medical History:   Diagnosis Date    BPH (benign prostatic hyperplasia)     Elevated PSA     Hematuria     Hypertension     Kidney stones           Past Surgical History:   Procedure Laterality Date    HX LITHOTRIPSY           Social History   Substance Use Topics    Smoking status: Never Smoker    Smokeless tobacco: Current User     Types: Chew    Alcohol use No     Work status: The patient is employed. Marital status: The patient is . Current Outpatient Prescriptions   Medication Sig Dispense Refill    lisinopril (PRINIVIL, ZESTRIL) 20 mg tablet Take  by mouth daily. No Known Allergies       History reviewed. No pertinent family history.       REVIEW OF SYSTEMS  Constitutional symptoms: Negative  Eyes: Negative  Ears, Nose, Throat, and Mouth: Negative  Cardiovascular: Negative  Respiratory: Negative  Genitourinary: Negative  Integumentary (Skin and/or breast): Negative  Musculoskeletal: Positive for low back pain, RLE pain and paraesthesias  Extremities: Negative for edema.  Endocrine/Rheumatologic: Negative  Hematologic/Lymphatic: Negative  Allergic/Immunologic: Negative  Psychiatric: Negative       PHYSICAL EXAMINATION    Visit Vitals    /85    Pulse 72    Ht 6' (1.829 m)    Wt 113.9 kg (251 lb)    BMI 34.04 kg/m2       CONSTITUTIONAL: NAD, A&O x 3  HEART: Regular rate and rhythm  ABDOMEN: Positive bowel sounds, soft, nontender, and nondistended  LUNGS: Clear to auscultation bilaterally. SKIN: Negative for rash. RANGE OF MOTION: The patient has full passive range of motion in all four extremities. SENSATION: Sensation is intact to light touch throughout. MOTOR:   Straight Leg Raise: Negative, bilateral  Franco: Negative, bilateral  Deep tendon reflexes are 0 at the brachioradialis, 1+ at the biceps, and 0 at the triceps bilaterally. Deep tendon reflexes are 0 at the knees and ankles bilaterally. Shoulder AB/Flex Elbow Flex Wrist Ext Elbow Ext Wrist Flex Hand Intrin Tone   Right +4/5 +4/5 +4/5 +4/5 +4/5 +4/5 +4/5   Left +4/5 +4/5 +4/5 +4/5 +4/5 +4/5 +4/5              Hip Flex Knee Ext Knee Flex Ankle DF GTE Ankle PF Tone   Right +4/5 +4/5 +4/5 +4/5 +4/5 +4/5 +4/5   Left +4/5 +4/5 +4/5 +4/5 +4/5 +4/5 +4/5     RADIOGRAPHS  Preliminary reading of lumbar plain films:  Mild disc space narrowing at L2/3 L4/5 and L5/S1. Small anterior osteophytes noted at L3 and L4. No acute pathology identified. These are being sent out for official reading by Dr. Marquez Clark. ASSESSMENT   Diagnoses and all orders for this visit:    1. Lumbar neuritis    2. DDD (degenerative disc disease), lumbar    3. Lumbosacral spondylosis without myelopathy           IMPRESSIONS/RECOMMENDATIONS:  Patient presents with low back pain radiating into the right buttock. He additioanlly endorses distal RLE pain and paraesthesias extending medially from the knee to the ankle.  The patient does not think his remaining pain complaints are severe enough to warrant additional workup/treatment at this time. Patient is neurologically intact. Multiple treatment options were discussed. I will see the patient back prn. Written by Juliano Johnson, as dictated by Phoebe Tafoya MD  I examined the patient, reviewed and agree with the note.

## 2018-11-14 ENCOUNTER — OFFICE VISIT (OUTPATIENT)
Dept: ORTHOPEDIC SURGERY | Age: 58
End: 2018-11-14

## 2018-11-14 VITALS
BODY MASS INDEX: 34.73 KG/M2 | HEIGHT: 72 IN | OXYGEN SATURATION: 97 % | SYSTOLIC BLOOD PRESSURE: 148 MMHG | HEART RATE: 90 BPM | DIASTOLIC BLOOD PRESSURE: 95 MMHG | WEIGHT: 256.4 LBS | RESPIRATION RATE: 18 BRPM

## 2018-11-14 DIAGNOSIS — G89.29 CHRONIC LEFT-SIDED LOW BACK PAIN WITH LEFT-SIDED SCIATICA: Primary | ICD-10-CM

## 2018-11-14 DIAGNOSIS — M54.42 CHRONIC LEFT-SIDED LOW BACK PAIN WITH LEFT-SIDED SCIATICA: Primary | ICD-10-CM

## 2018-11-14 RX ORDER — HYDROCODONE BITARTRATE AND ACETAMINOPHEN 5; 325 MG/1; MG/1
1 TABLET ORAL
Qty: 30 TAB | Refills: 0 | Status: SHIPPED | OUTPATIENT
Start: 2018-11-14 | End: 2019-01-24

## 2018-11-14 RX ORDER — PREDNISONE 20 MG/1
TABLET ORAL
Qty: 20 TAB | Refills: 0 | Status: SHIPPED | OUTPATIENT
Start: 2018-11-14 | End: 2019-05-02

## 2018-11-14 RX ORDER — METHYLPREDNISOLONE 4 MG/1
TABLET ORAL
COMMUNITY
End: 2018-12-03 | Stop reason: ALTCHOICE

## 2018-11-14 RX ORDER — GABAPENTIN 300 MG/1
300 CAPSULE ORAL
Qty: 30 CAP | Refills: 1 | Status: SHIPPED | OUTPATIENT
Start: 2018-11-14 | End: 2019-01-24

## 2018-11-14 NOTE — PROGRESS NOTES
Chief complaint/History of Present Illness:  Chief Complaint   Patient presents with    Back Pain     Follow up due to increased pain in low back    Leg Pain     LLE, numbnesss and tingling     HPI  Elida Weeks is a  63 y.o.  male      HISTORY OF PRESENT ILLNESS:  The patient comes in today stating he is having low back pain with left buttock pain that radiates down his posterior leg down to his foot. It has been going on for 11 days. He denies any injury or excessive activity although it did start acutely He has tried heat, ice, Tylenol, all without any relief at all. His pain is increased with standing. It is decreased if he sits in a hot bathtub. He did go to his PCP. They gave him a Medrol Dosepak. He took the last dose today. It helped a little bit at first. but it really does not help the pain much at all. He rates his pain as 10 out of 10. We last saw him in 08//2018 at which time he had low back pain with right buttock pain and right lower extremity pain and paresthesias from his knee to ankle. That cleared up on its own. However, this new pain is severe and disabling to him. He finds it very difficult to sleep. The pain is increased at nighttime. He  does do a home exercise program including stretches. He works as  in [employer]. His work has been very accommodating to him. He is a nonsmoker. He states the original back pain started back last 07/2018. He does get numbness and tingling in that right lower extremity. Denies fever, bowel or bladder dysfunction. PHYSICAL EXAMINATION:  Mr. Tono Rai is a 60-year-old male. He is alert and oriented. Normal mood and affect. He has a full weightbearing non-antalgic gait. No assist device. Strength 5/5 bilateral lower extremities. He has a very positive straight leg raise on the left, negative on the right. He has pain with hyperextension of the lumbar spine and forward flexion.      ASSESSMENT/PLAN:  This is a patient who has had acute-on-chronic back pain. His pain is severe. I do not think he could tolerate physical therapy at this time. He had a lumbar x-ray at the last visit. We are going to get an MRI of the lumbar spine. He exhibits symptoms of herniated disc or synovial cyst. I am going to give him a larger prednisone taper. Also, give him 1 week's worth of Norco 5/325 mg and start him on Neurontin 300 mg at bedtime. See him back following the MRI with Dr. Argelia Waller.            Review of systems:    Past Medical History:   Diagnosis Date    BPH (benign prostatic hyperplasia)     Elevated PSA     Hematuria     Hypertension     Kidney stones      Past Surgical History:   Procedure Laterality Date    HX LITHOTRIPSY       Social History     Socioeconomic History    Marital status:      Spouse name: Not on file    Number of children: Not on file    Years of education: Not on file    Highest education level: Not on file   Social Needs    Financial resource strain: Not on file    Food insecurity - worry: Not on file    Food insecurity - inability: Not on file   [employer] needs - medical: Not on file   [employer] needs - non-medical: Not on file   Occupational History    Not on file   Tobacco Use    Smoking status: Never Smoker    Smokeless tobacco: Current User     Types: Chew   Substance and Sexual Activity    Alcohol use: No    Drug use: No    Sexual activity: Not on file   Other Topics Concern     Service Not Asked    Blood Transfusions Not Asked    Caffeine Concern Not Asked    Occupational Exposure Not Asked    Hobby Hazards Not Asked    Sleep Concern Not Asked    Stress Concern Not Asked    Weight Concern Not Asked    Special Diet Not Asked    Back Care Not Asked    Exercise Not Asked    Bike Helmet Not Asked   [address] Not Asked    Self-Exams Not Asked   Social History Narrative    Not on file     Family History   Family history unknown: Yes       Physical Exam:  Visit Vitals  BP (!) 148/95   Pulse 90   Resp 18   Ht 6' (1.829 m)   Wt 256 lb 6.4 oz (116.3 kg)   SpO2 97%   BMI 34.77 kg/m²     Pain Scale: 10 - Worst pain ever/10            has been . reviewed and is appropriate          Diagnoses and all orders for this visit:    1. Chronic left-sided low back pain with left-sided sciatica  -     MRI LUMB SPINE WO CONT; Future  -     HYDROcodone-acetaminophen (NORCO) 5-325 mg per tablet; Take 1 Tab by mouth every six (6) hours as needed for Pain. Max Daily Amount: 4 Tabs. -     gabapentin (NEURONTIN) 300 mg capsule; Take 1 Cap by mouth nightly. Other orders  -     predniSONE (DELTASONE) 20 mg tablet; Take 3 tabs po x 3 days, then 2 tabs po x 3 days, then 1 tabs po x 3 days, then 1/2 tab po x 4 day            Follow-up Disposition:  Return in about 4 weeks (around 12/12/2018) for with Dr Steve Anderson for MRI f/u.         We have informed Jem Mackay to notify us for immediate appointment if he has any worsening neurogical symptoms or if an emergency situation presents, then call [phone]

## 2018-11-14 NOTE — PATIENT INSTRUCTIONS
Detail Level: Zone Learning About How to Have a Healthy Back  What causes back pain? Back pain is often caused by overuse, strain, or injury. For example, people often hurt their backs playing sports or working in the yard, being jolted in a car accident, or lifting something too heavy. Aging plays a part too. Your bones and muscles tend to lose strength as you age, which makes injury more likely. The spongy discs between the bones of the spine (vertebrae) may suffer from wear and tear and no longer provide enough cushion between the bones. A disc that bulges or breaks open (herniated disc) can press on nerves, causing back pain. In some people, back pain is the result of arthritis, broken vertebrae caused by bone loss (osteoporosis), illness, or a spine problem. Although most people have back pain at one time or another, there are steps you can take to make it less likely. How can you have a healthy back? Reduce stress on your back through good posture  Slumping or slouching alone may not cause low back pain. But after the back has been strained or injured, bad posture can make pain worse. · Sleep in a position that maintains your back's normal curves and on a mattress that feels comfortable. Sleep on your side with a pillow between your knees, or sleep on your back with a pillow under your knees. These positions can reduce strain on your back. · Stand and sit up straight. \"Good posture\" generally means your ears, shoulders, and hips are in a straight line. · If you must stand for a long time, put one foot on a stool, ledge, or box. Switch feet every now and then. · Sit in a chair that is low enough to let you place both feet flat on the floor with both knees nearly level with your hips. If your chair or desk is too high, use a footrest to raise your knees. Place a small pillow, a rolled-up towel, or a lumbar roll in the curve of your back if you need extra support.   · Try a kneeling chair, which helps tilt your hips The patient is a 41y year old Female complaining of bite, animal. forward. This takes pressure off your lower back. · Try sitting on an exercise ball. It can rock from side to side, which helps keep your back loose. · When driving, keep your knees nearly level with your hips. Sit straight, and drive with both hands on the steering wheel. Your arms should be in a slightly bent position. Reduce stress on your back through careful lifting  · Squat down, bending at the hips and knees only. If you need to, put one knee to the floor and extend your other knee in front of you, bent at a right angle (half kneeling). · Press your chest straight forward. This helps keep your upper back straight while keeping a slight arch in your low back. · Hold the load as close to your body as possible, at the level of your belly button (navel). · Use your feet to change direction, taking small steps. · Lead with your hips as you change direction. Keep your shoulders in line with your hips as you move. · Set down your load carefully, squatting with your knees and hips only. Exercise and stretch your back  · Do some exercise on most days of the week, if your doctor says it is okay. You can walk, run, swim, or cycle. · Stretch your back muscles. Here are a few exercises to try:  ? Lie on your back, and gently pull one bent knee to your chest. Put that foot back on the floor, and then pull the other knee to your chest.  ? Do pelvic tilts. Lie on your back with your knees bent. Tighten your stomach muscles. Pull your belly button (navel) in and up toward your ribs. You should feel like your back is pressing to the floor and your hips and pelvis are slightly lifting off the floor. Hold for 6 seconds while breathing smoothly. ? Sit with your back flat against a wall. · Keep your core muscles strong. The muscles of your back, belly (abdomen), and buttocks support your spine. ? Pull in your belly and imagine pulling your navel toward your spine. Hold this for 6 seconds, then relax.  Remember to keep breathing normally as you tense your muscles. ? Do curl-ups. Always do them with your knees bent. Keep your low back on the floor, and curl your shoulders toward your knees using a smooth, slow motion. Keep your arms folded across your chest. If this bothers your neck, try putting your hands behind your neck (not your head), with your elbows spread apart. ? Lie on your back with your knees bent and your feet flat on the floor. Tighten your belly muscles, and then push with your feet and raise your buttocks up a few inches. Hold this position 6 seconds as you continue to breathe normally, then lower yourself slowly to the floor. Repeat 8 to 12 times. ? If you like group exercise, try Pilates or yoga. These classes have poses that strengthen the core muscles. Lead a healthy lifestyle  · Stay at a healthy weight to avoid strain on your back. · Do not smoke. Smoking increases the risk of osteoporosis, which weakens the spine. If you need help quitting, talk to your doctor about stop-smoking programs and medicines. These can increase your chances of quitting for good. Where can you learn more? Go to http://estellaPrime Connectionsalfredito.info/. Enter L315 in the search box to learn more about \"Learning About How to Have a Healthy Back. \"  Current as of: November 29, 2017  Content Version: 11.8  © 9994-4922 Healthwise, Incorporated. Care instructions adapted under license by [employer] (which disclaims liability or warranty for this information). If you have questions about a medical condition or this instruction, always ask your healthcare professional. [place] [address] any warranty or liability for your use of this information. Low Back Pain: Exercises  Your Care Instructions  Here are some examples of typical rehabilitation exercises for your condition. Start each exercise slowly. Ease off the exercise if you start to have pain.   Your doctor or physical therapist will tell you when you can start these exercises and which ones will work best for you. How to do the exercises  Press-up    1. Lie on your stomach, supporting your body with your forearms. 2. Press your elbows down into the floor to raise your upper back. As you do this, relax your stomach muscles and allow your back to arch without using your back muscles. As your press up, do not let your hips or pelvis come off the floor. 3. Hold for 15 to 30 seconds, then relax. 4. Repeat 2 to 4 times. Alternate arm and leg (bird dog) exercise    1. Start on the floor, on your hands and knees. 2. Tighten your belly muscles. 3. Raise one leg off the floor, and hold it straight out behind you. Be careful not to let your hip drop down, because that will twist your trunk. 4. Hold for about 6 seconds, then lower your leg and switch to the other leg. 5. Repeat 8 to 12 times on each leg. 6. Over time, work up to holding for 10 to 30 seconds each time. 7. If you feel stable and secure with your leg raised, try raising the opposite arm straight out in front of you at the same time. Knee-to-chest exercise    1. Lie on your back with your knees bent and your feet flat on the floor. 2. Bring one knee to your chest, keeping the other foot flat on the floor (or keeping the other leg straight, whichever feels better on your lower back). 3. Keep your lower back pressed to the floor. Hold for at least 15 to 30 seconds. 4. Relax, and lower the knee to the starting position. 5. Repeat with the other leg. Repeat 2 to 4 times with each leg. 6. To get more stretch, put your other leg flat on the floor while pulling your knee to your chest.    Curl-ups    1. Lie on the floor on your back with your knees bent at a 90-degree angle. Your feet should be flat on the floor, about 12 inches from your buttocks.   2. Cross your arms over your chest. If this bothers your neck, try putting your hands behind your neck (not your head), with your elbows spread apart. 3. Slowly tighten your belly muscles and raise your shoulder blades off the floor. 4. Keep your head in line with your body, and do not press your chin to your chest.  5. Hold this position for 1 or 2 seconds, then slowly lower yourself back down to the floor. 6. Repeat 8 to 12 times. Pelvic tilt exercise    1. Lie on your back with your knees bent. 2. \"Brace\" your stomach. This means to tighten your muscles by pulling in and imagining your belly button moving toward your spine. You should feel like your back is pressing to the floor and your hips and pelvis are rocking back. 3. Hold for about 6 seconds while you breathe smoothly. 4. Repeat 8 to 12 times. Heel dig bridging    1. Lie on your back with both knees bent and your ankles bent so that only your heels are digging into the floor. Your knees should be bent about 90 degrees. 2. Then push your heels into the floor, squeeze your buttocks, and lift your hips off the floor until your shoulders, hips, and knees are all in a straight line. 3. Hold for about 6 seconds as you continue to breathe normally, and then slowly lower your hips back down to the floor and rest for up to 10 seconds. 4. Do 8 to 12 repetitions. Hamstring stretch in doorway    1. Lie on your back in a doorway, with one leg through the open door. 2. Slide your leg up the wall to straighten your knee. You should feel a gentle stretch down the back of your leg. 3. Hold the stretch for at least 15 to 30 seconds. Do not arch your back, point your toes, or bend either knee. Keep one heel touching the floor and the other heel touching the wall. 4. Repeat with your other leg. 5. Do 2 to 4 times for each leg. Hip flexor stretch    1. Kneel on the floor with one knee bent and one leg behind you. Place your forward knee over your foot. Keep your other knee touching the floor.   2. Slowly push your hips forward until you feel a stretch in the upper thigh of your rear leg.  3. Hold the stretch for at least 15 to 30 seconds. Repeat with your other leg. 4. Do 2 to 4 times on each side. Wall sit    1. Stand with your back 10 to 12 inches away from a wall. 2. Lean into the wall until your back is flat against it. 3. Slowly slide down until your knees are slightly bent, pressing your lower back into the wall. 4. Hold for about 6 seconds, then slide back up the wall. 5. Repeat 8 to 12 times. Follow-up care is a key part of your treatment and safety. Be sure to make and go to all appointments, and call your doctor if you are having problems. It's also a good idea to know your test results and keep a list of the medicines you take. Where can you learn more? Go to http://estella-alfredito.info/. Enter C505 in the search box to learn more about \"Low Back Pain: Exercises. \"  Current as of: November 29, 2017  Content Version: 11.8  © 5424-2315 Healthwise, Incorporated. Care instructions adapted under license by [employer] (which disclaims liability or warranty for this information). If you have questions about a medical condition or this instruction, always ask your healthcare professional. [address] any warranty or liability for your use of this information.

## 2018-11-23 ENCOUNTER — HOSPITAL ENCOUNTER (OUTPATIENT)
Age: 58
Discharge: HOME | End: 2018-11-23
Attending: NURSE PRACTITIONER
Payer: COMMERCIAL

## 2018-11-23 DIAGNOSIS — G89.29 CHRONIC LEFT-SIDED LOW BACK PAIN WITH LEFT-SIDED SCIATICA: ICD-10-CM

## 2018-11-23 DIAGNOSIS — M54.42 CHRONIC LEFT-SIDED LOW BACK PAIN WITH LEFT-SIDED SCIATICA: ICD-10-CM

## 2018-11-23 PROCEDURE — 72148 MRI LUMBAR SPINE W/O DYE: CPT

## 2018-12-03 ENCOUNTER — OFFICE VISIT (OUTPATIENT)
Dept: ORTHOPEDIC SURGERY | Age: 58
End: 2018-12-03

## 2018-12-03 VITALS
HEART RATE: 80 BPM | DIASTOLIC BLOOD PRESSURE: 88 MMHG | WEIGHT: 259 LBS | OXYGEN SATURATION: 97 % | BODY MASS INDEX: 35.08 KG/M2 | SYSTOLIC BLOOD PRESSURE: 137 MMHG | HEIGHT: 72 IN | TEMPERATURE: 98.5 F

## 2018-12-03 DIAGNOSIS — M54.16 LUMBAR NEURITIS: ICD-10-CM

## 2018-12-03 DIAGNOSIS — M51.36 DDD (DEGENERATIVE DISC DISEASE), LUMBAR: Primary | ICD-10-CM

## 2018-12-03 DIAGNOSIS — M51.26 HNP (HERNIATED NUCLEUS PULPOSUS), LUMBAR: ICD-10-CM

## 2018-12-03 PROBLEM — E66.01 SEVERE OBESITY (HCC): Status: ACTIVE | Noted: 2018-12-03

## 2018-12-03 RX ORDER — GABAPENTIN 300 MG/1
300 CAPSULE ORAL
Qty: 90 CAP | Refills: 1 | Status: SHIPPED | OUTPATIENT
Start: 2018-12-03 | End: 2019-01-24

## 2018-12-03 NOTE — PROGRESS NOTES
[facility] 
[address], [address] Phone: [phone] Fax: [phone] PROGRESS NOTE HISTORY OF PRESENT ILLNESS: 
The patient is a 63 y.o. male and was seen today for follow up of low back pain radiating into the right buttock. He additionally endorses distal RLE pain and paraestheisas extending medially from the knee to the ankle x a few months. He initially had low back pain radiating into the RLE medially from the groin to the ankle. His primary complaint at this time is paraesthesias. He denies specific injury or trauma. He denies hx of surgery, injections, or PT. Pt tried MDP with some relief. Pt denies fever, weight loss, or skin changes. Pt denies change in bowel or bladder habits. The patient is RHD. Preliminary reading of lumbar plain films revealed: mild disc space narrowing at L2/3 L4/5 and L5/S1. Small anterior osteophytes noted at L3 and L4. No acute pathology identified. At his last clinical appointment, patient presented with low back pain radiating into the right buttock. He additionally endorsed distal RLE pain and paraesthesias extending medially from the knee to the ankle. The patient did not think his remaining pain complaints were severe enough to warrant additional workup/treatment at that time. Patient is neurologically intact. Multiple treatment options were discussed. I will see the patient back prn. The patient returns today with low back pain extending into the LLE in an S1 distribution since the beginning of November. He rates his pain 9/10, an incresae from his last visit (0-4/10). I last saw this patient 8/8/18. He was scheduled to f/u prn. Pt is accompanied by his wife today. He completed MDP x 2 and took Norco without relief. He is taking Neurontin 300 mg qhs, which he is tolerating well without relief.  Note from Kb Dempsey NP dated 11/14/18 indicating patient was seen with c/o low back pain into the left buttocks, extending into the LLE in an S1 distribution for around 11 days. No injury. He was treated with MDP by his PCP without much relief. He was started on Neurontin qhs and given an Rx for Norco and set up for an MRI. Lumbar spine MRI dated 11/23/18 reviewed. Per report, moderate-sized superiorly directed extrusion at the L4/L5 level with resultant moderate spinal canal narrowing, as described. Moderate size left paracentral extrusion at the L5/S1 level which impinges upon the traversing left S1 nerve root. Multilevel degenerative changes as above. Multilevel foraminal stenosis, most pronounced and moderate right L3/L4, severe and right L4/L5, and moderate to severe at left L5/S1 levels. Please see report for level by level description. Nonspecific 1 cm T1 and T2 hypointense lesion in the L1 vertebral body. Suggest further characterization with contrast-enhanced MRI or CT. Likely bilateral renal cysts.  reviewed. Body mass index is 35.13 kg/m². PCP: Franky Joshi MD 
 
 
Past Medical History:  
Diagnosis Date  BPH (benign prostatic hyperplasia)  Elevated PSA  Hematuria  Hypertension  Kidney stones Social History Socioeconomic History  Marital status:  Spouse name: Not on file  Number of children: Not on file  Years of education: Not on file  Highest education level: Not on file Social Needs  Financial resource strain: Not on file  Food insecurity - worry: Not on file  Food insecurity - inability: Not on file  Transportation needs - medical: Not on file  Transportation needs - non-medical: Not on file Occupational History  Not on file Tobacco Use  Smoking status: Never Smoker  Smokeless tobacco: Current User Types: Chew Substance and Sexual Activity  Alcohol use: No  
 Drug use: No  
 Sexual activity: Not on file Other Topics Concern [employer] [address] Service Not Asked  Blood Transfusions Not Asked  Caffeine Concern Not Asked  Occupational Exposure Not Asked Tello Olsen Hazards Not Asked  Sleep Concern Not Asked  Stress Concern Not Asked  Weight Concern Not Asked  Special Diet Not Asked  Back Care Not Asked  Exercise Not Asked  Bike Helmet Not Asked  Seat Belt Not Asked  Self-Exams Not Asked Social History Narrative  Not on file Current Outpatient Medications Medication Sig Dispense Refill  methylPREDNISolone (MEDROL, SEBASTIAN,) 4 mg tablet Take  by mouth.  predniSONE (DELTASONE) 20 mg tablet Take 3 tabs po x 3 days, then 2 tabs po x 3 days, then 1 tabs po x 3 days, then 1/2 tab po x 4 day 20 Tab 0  
 HYDROcodone-acetaminophen (NORCO) 5-325 mg per tablet Take 1 Tab by mouth every six (6) hours as needed for Pain. Max Daily Amount: 4 Tabs. 30 Tab 0  
 gabapentin (NEURONTIN) 300 mg capsule Take 1 Cap by mouth nightly. 30 Cap 1  
 lisinopril (PRINIVIL, ZESTRIL) 20 mg tablet Take  by mouth daily. No Known Allergies PHYSICAL EXAMINATION Visit Vitals /88 Pulse 80 Temp 98.5 °F (36.9 °C) (Oral) Ht 6' (1.829 m) Wt 259 lb (117.5 kg) SpO2 97% BMI 35.13 kg/m² CONSTITUTIONAL: NAD, A&O x 3 SENSATION: Intact to light touch throughout RANGE OF MOTION: The patient has full passive range of motion in all four extremities. MOTOR: 
Straight Leg Raise: Negative, bilateral 
 
         
 Hip Flex Knee Ext Knee Flex Ankle DF GTE Ankle PF Tone Right +4/5 +4/5 +4/5 +4/5 +4/5 +4/5 +4/5 Left +4/5 +4/5 +4/5 +4/5 +4/5 +4/5 +4/5 ASSESSMENT Diagnoses and all orders for this visit: 1. DDD (degenerative disc disease), lumbar 2. Lumbar neuritis 3. HNP (herniated nucleus pulposus), lumbar IMPRESSION AND PLAN: 
Patient wishes to proceed with blocks.  I will order a left sided S1 SNRB and an L4/5 epidural. I will order a contrast enhanced MRI as recommended to evaluate the nonspecific 1 cm T1 and T2 hypointense lesion in the L1 vertebral body noted on his MRI. I will increase his Neurontin to 300 mg TID. Patient advised to call the office if intolerant to higher dose. Patient is neurologically intact. I will see the patient back following blocks. Written by Iraida Ann, as dictated by Irma Johnson MD 
I examined the patient, reviewed and agree with the note.

## 2018-12-04 DIAGNOSIS — F41.8 TEST ANXIETY: Primary | ICD-10-CM

## 2018-12-04 RX ORDER — DIAZEPAM 10 MG/1
TABLET ORAL
Qty: 1 TAB | Refills: 0 | OUTPATIENT
Start: 2018-12-04 | End: 2019-05-02

## 2018-12-13 ENCOUNTER — HOSPITAL ENCOUNTER (OUTPATIENT)
Age: 58
Discharge: HOME | End: 2018-12-13
Attending: PHYSICAL MEDICINE & REHABILITATION
Payer: COMMERCIAL

## 2018-12-13 DIAGNOSIS — M51.36 DDD (DEGENERATIVE DISC DISEASE), LUMBAR: ICD-10-CM

## 2018-12-13 DIAGNOSIS — M51.26 HNP (HERNIATED NUCLEUS PULPOSUS), LUMBAR: ICD-10-CM

## 2018-12-13 DIAGNOSIS — M54.16 LUMBAR NEURITIS: ICD-10-CM

## 2018-12-13 LAB — CREAT UR-MCNC: 1.1 MG/DL (ref 0.6–1.3)

## 2018-12-13 PROCEDURE — 74011636320 HC RX REV CODE- 636/320: Performed by: PHYSICAL MEDICINE & REHABILITATION

## 2018-12-13 PROCEDURE — A9575 INJ GADOTERATE MEGLUMI 0.1ML: HCPCS | Performed by: PHYSICAL MEDICINE & REHABILITATION

## 2018-12-13 PROCEDURE — 82565 ASSAY OF CREATININE: CPT

## 2018-12-13 PROCEDURE — 72149 MRI LUMBAR SPINE W/DYE: CPT

## 2018-12-13 RX ADMIN — GADOTERATE MEGLUMINE 25 ML: 376.9 INJECTION INTRAVENOUS at 11:00

## 2019-01-09 ENCOUNTER — OFFICE VISIT (OUTPATIENT)
Dept: ORTHOPEDIC SURGERY | Age: 59
End: 2019-01-09

## 2019-01-09 VITALS
BODY MASS INDEX: 35.21 KG/M2 | TEMPERATURE: 97.2 F | HEIGHT: 72 IN | RESPIRATION RATE: 16 BRPM | WEIGHT: 260 LBS | SYSTOLIC BLOOD PRESSURE: 120 MMHG | DIASTOLIC BLOOD PRESSURE: 80 MMHG | HEART RATE: 76 BPM | OXYGEN SATURATION: 95 %

## 2019-01-09 DIAGNOSIS — M51.36 DDD (DEGENERATIVE DISC DISEASE), LUMBAR: Primary | ICD-10-CM

## 2019-01-09 DIAGNOSIS — M54.16 LUMBAR RADICULOPATHY: ICD-10-CM

## 2019-01-09 DIAGNOSIS — M51.26 HNP (HERNIATED NUCLEUS PULPOSUS), LUMBAR: ICD-10-CM

## 2019-01-09 RX ORDER — GABAPENTIN 600 MG/1
600 TABLET ORAL 3 TIMES DAILY
Qty: 90 TAB | Refills: 1 | Status: SHIPPED | OUTPATIENT
Start: 2019-01-09 | End: 2019-01-24

## 2019-01-09 NOTE — PROGRESS NOTES
[facility]  [address], [place] [address]   Phone: [phone]  Fax: [phone]        PROGRESS NOTE      HISTORY OF PRESENT ILLNESS:  The patient is a 63 y.o. male and was seen today for follow up of low back pain extending into the LLE in an S1 distribution since the beginning of November. He was previously seen with low back pain radiating into the right buttock. He additionally endorses distal RLE pain and paraestheisas extending medially from the knee to the ankle x a few months. He initially had low back pain radiating into the RLE medially from the groin to the ankle. His primary complaint at this time is paraesthesias. He denies specific injury or trauma. He denies hx of surgery, injections, or PT. He completed MDP x 2 and took Norco without relief. Pt denies fever, weight loss, or skin changes. Pt denies change in bowel or bladder habits. Note from Robyn Garland, NP dated 11/14/18 indicating patient was seen with c/o low back pain into the left buttocks, extending into the LLE in an S1 distribution for around 11 days. No injury. He was treated with MDP by his PCP without much relief. He was started on Neurontin qhs and given an Rx for Norco and set up for an MRI. The patient is RHD. Preliminary reading of lumbar plain films revealed: mild disc space narrowing at L2/3 L4/5 and L5/S1. Small anterior osteophytes noted at L3 and L4. No acute pathology identified. Lumbar spine MRI dated 11/23/18 reviewed. Per report, moderate-sized superiorly directed extrusion at the L4/L5 level with resultant moderate spinal canal narrowing, as described. Moderate size left paracentral extrusion at the L5/S1 level which impinges upon the traversing left S1 nerve root. Multilevel degenerative changes as above. Multilevel foraminal stenosis, most pronounced and moderate right L3/L4, severe and right L4/L5, and moderate to severe at left L5/S1 levels.  Please see report for level by level description. Nonspecific 1 cm T1 and T2 hypointense lesion in the L1 vertebral body. Suggest further characterization with contrast-enhanced MRI or CT. Likely bilateral renal cysts. At his last clinical appointment, patient wished to proceed with blocks. I ordered a left sided S1 SNRB and an L4/5 epidural. I ordered a contrast enhanced MRI as recommended to evaluate the nonspecific 1 cm T1 and T2 hypointense lesion in the L1 vertebral body noted on his MRI. I increased his Neurontin to 300 mg TID. The patient returns today with low back pain with paraesthesias extending into the LLE to the great toe. He rates his pain 2/10, a decrease from his last visit (9/10). Pt is accompanied by his wife today. He is tolerating increased Neurontin 300 mg TID with additional relief. Pt denies change in bowel or bladder habits. Pt underwent a left-sided S1 SNRB and an L4/5 epidural on 12/18/18 with good relief.  reviewed. Body mass index is 35.26 kg/m².       PCP: Namita Anaya MD      Past Medical History:   Diagnosis Date    BPH (benign prostatic hyperplasia)     Elevated PSA     Hematuria     Hypertension     Kidney stones         Social History     Socioeconomic History    Marital status:      Spouse name: Not on file    Number of children: Not on file    Years of education: Not on file    Highest education level: Not on file   Social Needs    Financial resource strain: Not on file    Food insecurity - worry: Not on file    Food insecurity - inability: Not on file    Transportation needs - medical: Not on file   Rubysophic needs - non-medical: Not on file   Occupational History    Not on file   Tobacco Use    Smoking status: Never Smoker    Smokeless tobacco: Current User     Types: Chew   Substance and Sexual Activity    Alcohol use: No    Drug use: No    Sexual activity: Not on file   Other Topics Concern     Service Not Asked    Blood Transfusions Not Asked  Caffeine Concern Not Asked    Occupational Exposure Not Asked    Hobby Hazards Not Asked    Sleep Concern Not Asked    Stress Concern Not Asked    Weight Concern Not Asked    Special Diet Not Asked    Back Care Not Asked    Exercise Not Asked    Bike Helmet Not Asked   [address] Not Asked    Self-Exams Not Asked   Social History Narrative    Not on file       Current Outpatient Medications   Medication Sig Dispense Refill    diazePAM (VALIUM) 10 mg tablet Take 1 tab by mouth as directed by nurse prior to procedure 1 Tab 0    gabapentin (NEURONTIN) 300 mg capsule Take 1 Cap by mouth three (3) times daily (with meals). 90 Cap 1    predniSONE (DELTASONE) 20 mg tablet Take 3 tabs po x 3 days, then 2 tabs po x 3 days, then 1 tabs po x 3 days, then 1/2 tab po x 4 day 20 Tab 0    HYDROcodone-acetaminophen (NORCO) 5-325 mg per tablet Take 1 Tab by mouth every six (6) hours as needed for Pain. Max Daily Amount: 4 Tabs. 30 Tab 0    gabapentin (NEURONTIN) 300 mg capsule Take 1 Cap by mouth nightly. 30 Cap 1    lisinopril (PRINIVIL, ZESTRIL) 20 mg tablet Take  by mouth daily. No Known Allergies       PHYSICAL EXAMINATION    Visit Vitals  /80   Pulse 76   Temp 97.2 °F (36.2 °C) (Oral)   Resp 16   Ht 6' (1.829 m)   Wt 260 lb (117.9 kg)   SpO2 95%   BMI 35.26 kg/m²       CONSTITUTIONAL: NAD, A&O x 3  SENSATION: Intact to light touch throughout  RANGE OF MOTION: The patient has full passive range of motion in all four extremities. MOTOR:  Straight Leg Raise: Negative, bilateral               Hip Flex Knee Ext Knee Flex Ankle DF GTE Ankle PF Tone   Right +4/5 +4/5 +4/5 +4/5 +4/5 +4/5 +4/5   Left +4/5 +4/5 +4/5 +4/5 +4/5 +4/5 +4/5       ASSESSMENT   Diagnoses and all orders for this visit:    1. DDD (degenerative disc disease), lumbar    2. Lumbar radiculopathy    3.  HNP (herniated nucleus pulposus), lumbar          IMPRESSION AND PLAN:  Patient is s/p a left-sided S1 SNRB and an L4/5 epidural on 12/18/18 noting good relief. I will increase his Neurontin to 600 mg TID. Patient advised to call the office if intolerant to higher dose. Physical therapy declined. Patient is neurologically intact. I will see the patient back in 1 month's time or earlier if needed. Written by Abilio Bardales, as dictated by Beth Doll MD  I examined the patient, reviewed and agree with the note.

## 2019-03-18 RX ORDER — GABAPENTIN 600 MG/1
600 TABLET ORAL 3 TIMES DAILY
Qty: 90 TAB | Refills: 0 | Status: SHIPPED | OUTPATIENT
Start: 2019-03-18 | End: 2019-08-07 | Stop reason: SDUPTHER

## 2019-03-18 NOTE — TELEPHONE ENCOUNTER
03-   1:48pm   Called patient to make appointment per nurse practitioner , patient states he will call back to schedule because he's at work right now and cant talk.

## 2019-03-18 NOTE — TELEPHONE ENCOUNTER
Last Visit: 1/9/19  Next Appt:  Cancelled 2/6 Appt  Previous Refill Encounter: 1/9-90+1    Requested Prescriptions     Pending Prescriptions Disp Refills    gabapentin (NEURONTIN) 600 mg tablet 90 Tab 1     Sig: Take 1 Tab by mouth three (3) times daily.

## 2019-04-08 ENCOUNTER — OFFICE VISIT (OUTPATIENT)
Dept: ORTHOPEDIC SURGERY | Age: 59
End: 2019-04-08

## 2019-04-08 VITALS
HEART RATE: 85 BPM | TEMPERATURE: 96.8 F | HEIGHT: 72 IN | SYSTOLIC BLOOD PRESSURE: 122 MMHG | WEIGHT: 257 LBS | OXYGEN SATURATION: 93 % | DIASTOLIC BLOOD PRESSURE: 79 MMHG | RESPIRATION RATE: 24 BRPM | BODY MASS INDEX: 34.81 KG/M2

## 2019-04-08 DIAGNOSIS — M54.16 LUMBAR RADICULOPATHY: ICD-10-CM

## 2019-04-08 DIAGNOSIS — M51.26 HNP (HERNIATED NUCLEUS PULPOSUS), LUMBAR: ICD-10-CM

## 2019-04-08 DIAGNOSIS — M51.36 DDD (DEGENERATIVE DISC DISEASE), LUMBAR: Primary | ICD-10-CM

## 2019-04-08 RX ORDER — DICLOFENAC SODIUM 75 MG/1
75 TABLET, DELAYED RELEASE ORAL 2 TIMES DAILY
Refills: 1 | COMMUNITY
Start: 2019-03-26

## 2019-04-08 RX ORDER — GABAPENTIN 600 MG/1
600 TABLET ORAL 3 TIMES DAILY
Qty: 270 TAB | Refills: 0 | Status: SHIPPED | OUTPATIENT
Start: 2019-04-08 | End: 2019-05-02

## 2019-04-08 NOTE — PROGRESS NOTES
1. Have you been to the ER, urgent care clinic since your last visit? Hospitalized since your last visit? No    2. Have you seen or consulted any other health care providers outside of the [address] [place] [address] since your last visit? Include any pap smears or colon screening.  No

## 2019-04-08 NOTE — PROGRESS NOTES
[facility]  [address], [place]   Phone: [phone]  Fax: [phone]        PROGRESS NOTE      HISTORY OF PRESENT ILLNESS:  The patient is a 63 y.o. male and was seen today for follow up of low back pain with paraesthesias extending into the LLE to the great toe. He initially had low back pain radiating into the RLE medially from the groin to the ankle. His primary complaint at this time is paraesthesias. He denies specific injury or trauma. He denies hx of surgery or PT. Pt underwent a left-sided S1 SNRB and an L4/5 epidural on 12/18/18 with good relief. He completed MDP x 2 and took Norco without relief. Pt denies fever, weight loss, or skin changes. Pt denies change in bowel or bladder habits. Note from Meriel Riedel, NP dated 11/14/18 indicating patient was seen with c/o low back pain into the left buttocks, extending into the LLE in an S1 distribution for around 11 days. No injury. He was treated with MDP by his PCP without much relief. He was started on Neurontin qhs and given an Rx for Norco and set up for an MRI. The patient is RHD. Preliminary reading of lumbar plain films revealed: mild disc space narrowing at L2/3 L4/5 and L5/S1. Small anterior osteophytes noted at L3 and L4. No acute pathology identified. Lumbar spine MRI dated 11/23/18 reviewed. Per report, moderate-sized superiorly directed extrusion at the L4/L5 level with resultant moderate spinal canal narrowing, as described. Moderate size left paracentral extrusion at the L5/S1 level which impinges upon the traversing left S1 nerve root. Multilevel degenerative changes as above. Multilevel foraminal stenosis, most pronounced and moderate right L3/L4, severe and right L4/L5, and moderate to severe at left L5/S1 levels. Please see report for level by level description. Nonspecific 1 cm T1 and T2 hypointense lesion in the L1 vertebral body.  Suggest further characterization with contrast-enhanced MRI or CT. Likely bilateral renal cysts. At his last clinical appointment, patient was s/p a left-sided S1 SNRB and an L4/5 epidural on 12/18/18 noting good relief. I increased his Neurontin to 600 mg TID. Physical therapy declined. The patient returns today with main c/o progressive left foot paraesthesias. He rates his pain 1/10, previously 2/10. He is tolerating increased Neurontin to 600 mg TID with good additional relief. Pt denies change in bowel or bladder habits.  reviewed. Body mass index is 34.86 kg/m².       PCP: Krishna Roberts MD      Past Medical History:   Diagnosis Date    BPH (benign prostatic hyperplasia)     Elevated PSA     Hematuria     Hypertension     Kidney stones         Social History     Socioeconomic History    Marital status:      Spouse name: Not on file    Number of children: Not on file    Years of education: Not on file    Highest education level: Not on file   Occupational History    Not on file   Social Needs    Financial resource strain: Not on file    Food insecurity:     Worry: Not on file     Inability: Not on file    Transportation needs:     Medical: Not on file     Non-medical: Not on file   Tobacco Use    Smoking status: Never Smoker    Smokeless tobacco: Current User     Types: Chew   Substance and Sexual Activity    Alcohol use: No    Drug use: No    Sexual activity: Not on file   Lifestyle    Physical activity:     Days per week: Not on file     Minutes per session: Not on file    Stress: Not on file   Relationships    Social connections:     Talks on phone: Not on file     Gets together: Not on file     Attends Yazidism service: Not on file     Active member of club or organization: Not on file     Attends meetings of clubs or organizations: Not on file     Relationship status: Not on file    Intimate partner violence:     Fear of current or ex partner: Not on file     Emotionally abused: Not on file Physically abused: Not on file     Forced sexual activity: Not on file   Other Topics Concern     Service Not Asked    Blood Transfusions Not Asked    Caffeine Concern Not Asked    Occupational Exposure Not Asked    Hobby Hazards Not Asked    Sleep Concern Not Asked    Stress Concern Not Asked    Weight Concern Not Asked    Special Diet Not Asked    Back Care Not Asked    Exercise Not Asked    Bike Helmet Not Asked   [address] Not Asked    Self-Exams Not Asked   Social History Narrative    Not on file       Current Outpatient Medications   Medication Sig Dispense Refill    gabapentin (NEURONTIN) 600 mg tablet Take 1 Tab by mouth three (3) times daily. 90 Tab 0    omeprazole (PRILOSEC) 40 mg capsule       valACYclovir (VALTREX) 500 mg tablet       sertraline (ZOLOFT) 100 mg tablet       atorvastatin (LIPITOR) 10 mg tablet Take  by mouth daily.  levoFLOXacin (LEVAQUIN) 500 mg tablet Take 1 Tab by mouth daily. 21 Tab 0    lisinopril (PRINIVIL, ZESTRIL) 20 mg tablet Take  by mouth daily.  diclofenac EC (VOLTAREN) 75 mg EC tablet   1    diazePAM (VALIUM) 10 mg tablet Take 1 tab by mouth as directed by nurse prior to procedure 1 Tab 0    predniSONE (DELTASONE) 20 mg tablet Take 3 tabs po x 3 days, then 2 tabs po x 3 days, then 1 tabs po x 3 days, then 1/2 tab po x 4 day 20 Tab 0       No Known Allergies       PHYSICAL EXAMINATION    Visit Vitals  /79   Pulse 85   Temp 96.8 °F (36 °C)   Resp 24   Ht 6' (1.829 m)   Wt 257 lb (116.6 kg)   SpO2 93%   BMI 34.86 kg/m²       CONSTITUTIONAL: NAD, A&O x 3  SENSATION: Intact to light touch throughout  RANGE OF MOTION: The patient has full passive range of motion in all four extremities.   MOTOR:  Straight Leg Raise: Negative, bilateral               Hip Flex Knee Ext Knee Flex Ankle DF GTE Ankle PF Tone   Right +4/5 +4/5 +4/5 +4/5 +4/5 +4/5 +4/5   Left +4/5 +4/5 +4/5 +4/5 +4/5 +4/5 +4/5       ASSESSMENT   Diagnoses and all orders for this visit:    1. DDD (degenerative disc disease), lumbar    2. Lumbar radiculopathy    3. HNP (herniated nucleus pulposus), lumbar          IMPRESSION AND PLAN:  Patient returns today with main c/o progressive left foot paraesthesias. I provided him with refills of Neurontin to 600 mg TID. Patient is neurologically intact. I will see the patient back in 3 month's time or earlier if needed. Written by Jeffry Ahn, as dictated by Amrik Segura MD  I examined the patient, reviewed and agree with the note.

## 2019-04-08 NOTE — LETTER
4/8/19 Patient: Michael Aguilar YOB: 1960 Date of Visit: 4/8/2019 New Hoover MD 
[facility] [address] [address] VIA Facsimile: [phone] Dear New Hoover MD, Thank you for referring Mr. Suzanne Sharpe to [address] [place] [address] for evaluation. My notes for this consultation are attached. If you have questions, please do not hesitate to call me. I look forward to following your patient along with you. Sincerely, Sherrie Flores MD

## 2019-07-22 RX ORDER — GABAPENTIN 600 MG/1
600 TABLET ORAL 3 TIMES DAILY
Qty: 90 TAB | Refills: 0 | OUTPATIENT
Start: 2019-07-22

## 2019-07-22 NOTE — TELEPHONE ENCOUNTER
Last Visit: 4/8/19 with MD Arina Candelaria  Next Appointment: return in 3 months - provider cancelled appt on 7/10/19  Previous Refill Encounter(s): 3/18/19 #90    Requested Prescriptions     Pending Prescriptions Disp Refills    gabapentin (NEURONTIN) 600 mg tablet 90 Tab 0     Sig: Take 1 Tab by mouth three (3) times daily.

## 2019-08-07 ENCOUNTER — OFFICE VISIT (OUTPATIENT)
Dept: ORTHOPEDIC SURGERY | Age: 59
End: 2019-08-07

## 2019-08-07 VITALS
HEART RATE: 67 BPM | BODY MASS INDEX: 34.86 KG/M2 | OXYGEN SATURATION: 97 % | DIASTOLIC BLOOD PRESSURE: 74 MMHG | RESPIRATION RATE: 16 BRPM | TEMPERATURE: 96.6 F | SYSTOLIC BLOOD PRESSURE: 108 MMHG | HEIGHT: 72 IN | WEIGHT: 257.4 LBS

## 2019-08-07 DIAGNOSIS — M51.36 DDD (DEGENERATIVE DISC DISEASE), LUMBAR: Primary | ICD-10-CM

## 2019-08-07 DIAGNOSIS — M51.26 HNP (HERNIATED NUCLEUS PULPOSUS), LUMBAR: ICD-10-CM

## 2019-08-07 DIAGNOSIS — M54.16 LUMBAR RADICULOPATHY: ICD-10-CM

## 2019-08-07 RX ORDER — GABAPENTIN 600 MG/1
600 TABLET ORAL 3 TIMES DAILY
Qty: 270 TAB | Refills: 1 | Status: SHIPPED | OUTPATIENT
Start: 2019-08-07 | End: 2020-02-05 | Stop reason: SDUPTHER

## 2019-08-07 NOTE — PROGRESS NOTES
Chief complaint/History of Present Illness:  Chief Complaint   Patient presents with    Back Pain     3 month follow up and med refill    Leg Pain     LLE     ALEX Bhagat is a  63 y.o.  male      HISTORY OF PRESENT ILLNESS:  The patient comes in today for followup of his low back pain with paresthesias that radiate into his left lower extremity to his great toe. He states he still gets some numbness in that left lateral calf and to the toe. He also gets some pain in his back, but Neurontin 600 mg three times a day is helping control his pain. He rates his pain as a 2/10 now. He denies any side effects from it. He just had his labs checked by Dr. Shreyas Erickson, and he states his liver and kidney function are fine. He is status post a left S1 selective nerve root block and L4-5 epidural steroid injection back in December of 2018, which did well and is still doing well. He does not feel like he needs a repeat at this time. He works at the [employer]. He is a nonsmoker. He denies fever and bowel or bladder dysfunction. PHYSICAL EXAM:  Mr. Mannie Sicard is a 64-year-old male. He is alert and oriented with a normal mood and affect. He has a full weightbearing, non-antalgic gait using no assistive device. He has 5/5 strength of the bilateral lower extremities and negative straight leg raise. There is no pain with hyperextension of the lumbar spine. ASSESSMENT/PLAN:  This is a patient who has degenerative disc disease, lumbar radiculopathy, and herniated disc. He is doing well. We will continue his Gabapentin. I did explain to him it is now a schedule five drug. He can call in the next month if he feels like he needs a repeat block. I did explain to him he can have three a year but to only have them when he absolutely needs them. We will see him back in six months or sooner if needed.         Review of systems:    Past Medical History:   Diagnosis Date    BPH (benign prostatic hyperplasia)     Elevated PSA     Hematuria     Hypertension     Kidney stones      Past Surgical History:   Procedure Laterality Date    HX LITHOTRIPSY       Social History     Socioeconomic History    Marital status:      Spouse name: Not on file    Number of children: Not on file    Years of education: Not on file    Highest education level: Not on file   Occupational History    Not on file   Social Needs    Financial resource strain: Not on file    Food insecurity:     Worry: Not on file     Inability: Not on file    Transportation needs:     Medical: Not on file     Non-medical: Not on file   Tobacco Use    Smoking status: Never Smoker    Smokeless tobacco: Current User     Types: Chew   Substance and Sexual Activity    Alcohol use: No    Drug use: No    Sexual activity: Not on file   Lifestyle    Physical activity:     Days per week: Not on file     Minutes per session: Not on file    Stress: Not on file   Relationships    Social connections:     Talks on phone: Not on file     Gets together: Not on file     Attends Pentecostalism service: Not on file     Active member of club or organization: Not on file     Attends meetings of clubs or organizations: Not on file     Relationship status: Not on file    Intimate partner violence:     Fear of current or ex partner: Not on file     Emotionally abused: Not on file     Physically abused: Not on file     Forced sexual activity: Not on file   Other Topics Concern     Service Not Asked    Blood Transfusions Not Asked    Caffeine Concern Not Asked    Occupational Exposure Not Asked   Krista Quinones Hazards Not Asked    Sleep Concern Not Asked    Stress Concern Not Asked    Weight Concern Not Asked    Special Diet Not Asked    Back Care Not Asked    Exercise Not Asked    Bike Helmet Not Asked   [address] Not Asked    Self-Exams Not Asked   Social History Narrative    Not on file     Family History   Family history unknown:  Yes Physical Exam:  Visit Vitals  /74   Pulse 67   Temp 96.6 °F (35.9 °C) (Oral)   Resp 16   Ht 6' (1.829 m)   Wt 257 lb 6.4 oz (116.8 kg)   SpO2 97%   BMI 34.91 kg/m²     Pain Scale: 2/10     has been . reviewed and is appropriate          Diagnoses and all orders for this visit:    1. DDD (degenerative disc disease), lumbar  -     gabapentin (NEURONTIN) 600 mg tablet; Take 1 Tab by mouth three (3) times daily. Indications: Neuropathic Pain    2. Lumbar radiculopathy  -     gabapentin (NEURONTIN) 600 mg tablet; Take 1 Tab by mouth three (3) times daily. Indications: Neuropathic Pain    3. HNP (herniated nucleus pulposus), lumbar  -     gabapentin (NEURONTIN) 600 mg tablet; Take 1 Tab by mouth three (3) times daily. Indications: Neuropathic Pain            Follow-up and Dispositions    · Return in about 6 months (around 2/7/2020) for with NP.              We have informed Luke Donita to notify us for immediate appointment if he has any worsening neurogical symptoms or if an emergency situation presents, then call [phone]

## 2019-09-16 ENCOUNTER — TELEPHONE (OUTPATIENT)
Dept: ORTHOPEDIC SURGERY | Age: 59
End: 2019-09-16

## 2019-09-16 NOTE — TELEPHONE ENCOUNTER
Please call pharmracy. If they did loose this, ok to give gabapentin 600 mg TID #270, 1RF VO. Patient seen recently with Duran Merino.

## 2020-02-05 ENCOUNTER — OFFICE VISIT (OUTPATIENT)
Dept: ORTHOPEDIC SURGERY | Age: 60
End: 2020-02-05

## 2020-02-05 VITALS
HEART RATE: 78 BPM | TEMPERATURE: 96.7 F | DIASTOLIC BLOOD PRESSURE: 79 MMHG | WEIGHT: 260.6 LBS | BODY MASS INDEX: 35.3 KG/M2 | RESPIRATION RATE: 16 BRPM | OXYGEN SATURATION: 96 % | SYSTOLIC BLOOD PRESSURE: 130 MMHG | HEIGHT: 72 IN

## 2020-02-05 DIAGNOSIS — M51.36 DDD (DEGENERATIVE DISC DISEASE), LUMBAR: ICD-10-CM

## 2020-02-05 DIAGNOSIS — M51.26 HNP (HERNIATED NUCLEUS PULPOSUS), LUMBAR: ICD-10-CM

## 2020-02-05 DIAGNOSIS — M54.16 LUMBAR RADICULOPATHY: ICD-10-CM

## 2020-02-05 RX ORDER — GABAPENTIN 600 MG/1
600 TABLET ORAL 3 TIMES DAILY
Qty: 270 TAB | Refills: 1 | Status: SHIPPED | OUTPATIENT
Start: 2020-02-05 | End: 2020-03-23 | Stop reason: SDUPTHER

## 2020-02-05 NOTE — PROGRESS NOTES
Chief complaint/History of Present Illness:  Chief Complaint   Patient presents with    Back Pain     6 month follow up and med refill    Leg Pain     lle     ALEX Márquez is a  63 y.o.  male      HISTORY OF PRESENT ILLNESS:  The patient comes in today for followup of his chronic low back pain. He still gets paresthesias and numbness in his left lateral calf down to his toes and foot. He rates his pain today as a 3/10. He states he gets flares of pain occasionally, and then he just uses rest until it settles down. He does not like to take a lot of medication. He is already on Voltaren through his PCP. He is taking Neurontin 600 mg three times a day, which does help his back and leg pain. He has had a left S1 selective nerve root block and an L4-5 epidural steroid injection back in December 2018, which was helpful. He does not feel like he needs a repeat at this time. He does do a home exercise program.     PHYSICAL EXAM:  Mr. Alyssia Mueller is a 28-year-old male. He is alert and oriented. He has a normal mood and affect. He has a full weightbearing, non-antalgic gait using no assistive device. He has 5/5 strength of the bilateral lower extremities and negative straight leg raise. He has pain with hyperextension of the lumbar spine. ASSESSMENT/PLAN:  This is a patient who has degenerative disc disease, facet arthropathy, and disc herniation at L4-5 and L5-S1 that give him back and leg pain. He does not wish to have any surgery. He did inquire about an incline table. I told him it was a treatment and it only provides temporary relief but not really hurt him. I have refilled his Neurontin. This enables him to continue working for the [employer]. We will see him back in six months or sooner if needed.         Review of systems:    Past Medical History:   Diagnosis Date    BPH (benign prostatic hyperplasia)     Elevated PSA     Hematuria     Hypertension     Kidney stones Past Surgical History:   Procedure Laterality Date    HX LITHOTRIPSY       Social History     Socioeconomic History    Marital status:      Spouse name: Not on file    Number of children: Not on file    Years of education: Not on file    Highest education level: Not on file   Occupational History    Not on file   Social Needs    Financial resource strain: Not on file    Food insecurity:     Worry: Not on file     Inability: Not on file    Transportation needs:     Medical: Not on file     Non-medical: Not on file   Tobacco Use    Smoking status: Never Smoker    Smokeless tobacco: Current User     Types: Chew   Substance and Sexual Activity    Alcohol use: No    Drug use: No    Sexual activity: Not on file   Lifestyle    Physical activity:     Days per week: Not on file     Minutes per session: Not on file    Stress: Not on file   Relationships    Social connections:     Talks on phone: Not on file     Gets together: Not on file     Attends Pentecostal service: Not on file     Active member of club or organization: Not on file     Attends meetings of clubs or organizations: Not on file     Relationship status: Not on file    Intimate partner violence:     Fear of current or ex partner: Not on file     Emotionally abused: Not on file     Physically abused: Not on file     Forced sexual activity: Not on file   Other Topics Concern     Service Not Asked    Blood Transfusions Not Asked    Caffeine Concern Not Asked    Occupational Exposure Not Asked   Williemae Red Hazards Not Asked    Sleep Concern Not Asked    Stress Concern Not Asked    Weight Concern Not Asked    Special Diet Not Asked    Back Care Not Asked    Exercise Not Asked    Bike Helmet Not Asked   [address] Not Asked    Self-Exams Not Asked   Social History Narrative    Not on file     Family History   Family history unknown: Yes       Physical Exam:  Visit Vitals  /79 (BP 1 Location: Left arm, BP Patient Position: Sitting)   Pulse 78   Temp 96.7 °F (35.9 °C) (Oral)   Resp 16   Ht 6' (1.829 m)   Wt 260 lb 9.6 oz (118.2 kg)   SpO2 96%   BMI 35.34 kg/m²     Pain Scale: 3/10       has been . reviewed and is appropriate          Diagnoses and all orders for this visit:    1. DDD (degenerative disc disease), lumbar  -     gabapentin (NEURONTIN) 600 mg tablet; Take 1 Tab by mouth three (3) times daily. Indications: neuropathic pain    2. Lumbar radiculopathy  -     gabapentin (NEURONTIN) 600 mg tablet; Take 1 Tab by mouth three (3) times daily. Indications: neuropathic pain    3. HNP (herniated nucleus pulposus), lumbar  -     gabapentin (NEURONTIN) 600 mg tablet; Take 1 Tab by mouth three (3) times daily. Indications: neuropathic pain            Follow-up and Dispositions    · Return in about 6 months (around 8/5/2020) for with NP.              We have informed Nikki Danielhelen to notify us for immediate appointment if he has any worsening neurogical symptoms or if an emergency situation presents, then call [phone]

## 2020-03-23 DIAGNOSIS — M51.36 DDD (DEGENERATIVE DISC DISEASE), LUMBAR: ICD-10-CM

## 2020-03-23 DIAGNOSIS — M51.26 HNP (HERNIATED NUCLEUS PULPOSUS), LUMBAR: ICD-10-CM

## 2020-03-23 DIAGNOSIS — M54.16 LUMBAR RADICULOPATHY: ICD-10-CM

## 2020-03-23 NOTE — TELEPHONE ENCOUNTER
[facility] requests a 90 d/s of medication on the patient's behalf    Last Visit: 02/05/2020 with TOM Engel  Next Appointment: 08/19/2020 with TOM Engel    Requested Prescriptions     Pending Prescriptions Disp Refills    gabapentin (NEURONTIN) 600 mg tablet 270 Tab 1     Sig: Take 1 Tab by mouth three (3) times daily.  Indications: neuropathic pain

## 2020-03-24 RX ORDER — GABAPENTIN 600 MG/1
600 TABLET ORAL 3 TIMES DAILY
Qty: 270 TAB | Refills: 1 | Status: SHIPPED | OUTPATIENT
Start: 2020-03-24 | End: 2020-08-19 | Stop reason: SDUPTHER

## 2020-08-19 ENCOUNTER — OFFICE VISIT (OUTPATIENT)
Dept: ORTHOPEDIC SURGERY | Age: 60
End: 2020-08-19

## 2020-08-19 VITALS
HEART RATE: 73 BPM | RESPIRATION RATE: 16 BRPM | WEIGHT: 251.6 LBS | HEIGHT: 72 IN | BODY MASS INDEX: 34.08 KG/M2 | DIASTOLIC BLOOD PRESSURE: 78 MMHG | SYSTOLIC BLOOD PRESSURE: 128 MMHG | OXYGEN SATURATION: 97 % | TEMPERATURE: 98.1 F

## 2020-08-19 DIAGNOSIS — M54.16 LUMBAR RADICULOPATHY: ICD-10-CM

## 2020-08-19 DIAGNOSIS — M54.42 CHRONIC LEFT-SIDED LOW BACK PAIN WITH LEFT-SIDED SCIATICA: ICD-10-CM

## 2020-08-19 DIAGNOSIS — M51.26 HNP (HERNIATED NUCLEUS PULPOSUS), LUMBAR: ICD-10-CM

## 2020-08-19 DIAGNOSIS — M51.36 DDD (DEGENERATIVE DISC DISEASE), LUMBAR: Primary | ICD-10-CM

## 2020-08-19 DIAGNOSIS — G89.29 CHRONIC LEFT-SIDED LOW BACK PAIN WITH LEFT-SIDED SCIATICA: ICD-10-CM

## 2020-08-19 RX ORDER — GABAPENTIN 600 MG/1
600 TABLET ORAL 3 TIMES DAILY
Qty: 270 TAB | Refills: 1 | Status: SHIPPED | COMMUNITY
Start: 2020-09-19 | End: 2021-03-03

## 2020-08-19 NOTE — PROGRESS NOTES
Chief complaint   Chief Complaint   Patient presents with    Back Pain     6 month follow up low back and med refill    Leg Pain     lle    Foot Pain     left foot       History of Present Illness:  Hyla Severin is a  62 y.o.  male comes in today for follow-up of his chronic low back pain. States paresthesias and numbness into his left foot and toes. He states his back pain is increased with prolonged standing so he tries to change positions frequently. He does do a home exercise program with stretching. He rates his pain today as a 2 out of 10. He continues to take Voltaren through his PCP. He states the Neurontin 600 mg 3 times a day is controlling his symptoms he is not having any side effects from it. He denies fever bowel bladder dysfunction he underwent a left 4th nerve block with L5 epidural steroid injection in December 2018 he does not feel like he needs to have that repeated. He does feel like it did help. He continues to work for the [employer]. He is a non-smoker. He denies any new medical issues or trips to the ER. He denies fever bowel bladder dysfunction. Physical Exam: The patient is a 56-year-old male well-developed well-nourished who is alert and oriented with a normal mood and affect. He has a full weightbearing nonantalgic gait. He does not use any assistive device. He has 5/5 strength bilateral lower extremities. Negative straight leg raise. He has some mild pain with hyperextension lumbar spine. Assessment and Plan: This is a patient with chronic left-sided back pain with left neuritis down to his foot. He also has degenerative disc disease, facet arthropathy and an L4-5, L5-S1 herniated disc. His symptoms are well controlled with Neurontin. I will give him refills of that and see him back in 6 months sooner if needed.         Review of systems:    Past Medical History:   Diagnosis Date    BPH (benign prostatic hyperplasia)     Elevated PSA  Hematuria     Hypertension     Kidney stones      Past Surgical History:   Procedure Laterality Date    HX LITHOTRIPSY      HX WISDOM TEETH EXTRACTION       Social History     Socioeconomic History    Marital status:      Spouse name: Not on file    Number of children: Not on file    Years of education: Not on file    Highest education level: Not on file   Occupational History    Not on file   Social Needs    Financial resource strain: Not on file    Food insecurity     Worry: Not on file     Inability: Not on file    Transportation needs     Medical: Not on file     Non-medical: Not on file   Tobacco Use    Smoking status: Never Smoker    Smokeless tobacco: Current User     Types: Chew   Substance and Sexual Activity    Alcohol use: No    Drug use: No    Sexual activity: Not on file   Lifestyle    Physical activity     Days per week: Not on file     Minutes per session: Not on file    Stress: Not on file   Relationships    Social connections     Talks on phone: Not on file     Gets together: Not on file     Attends Jewish service: Not on file     Active member of club or organization: Not on file     Attends meetings of clubs or organizations: Not on file     Relationship status: Not on file    Intimate partner violence     Fear of current or ex partner: Not on file     Emotionally abused: Not on file     Physically abused: Not on file     Forced sexual activity: Not on file   Other Topics Concern     Service Not Asked    Blood Transfusions Not Asked    Caffeine Concern Not Asked    Occupational Exposure Not Asked   [place] Radish Hazards Not Asked    Sleep Concern Not Asked    Stress Concern Not Asked    Weight Concern Not Asked    Special Diet Not Asked    Back Care Not Asked    Exercise Not Asked    Bike Helmet Not Asked    Seat Belt Not Asked    Self-Exams Not Asked   Social History Narrative    Not on file     Family History   Problem Relation Age of Onset    Diabetes Mother     Other Mother         pancreas issues    Hypertension Father     Diabetes Father        Physical Exam:  Visit Vitals  /78 (BP 1 Location: Left arm, BP Patient Position: Sitting)   Pulse 73   Temp 98.1 °F (36.7 °C)   Resp 16   Ht 6' (1.829 m)   Wt 251 lb 9.6 oz (114.1 kg)   SpO2 97%   BMI 34.12 kg/m²     Pain Scale: 2/10       has been . reviewed and is appropriate          Diagnoses and all orders for this visit:    1. DDD (degenerative disc disease), lumbar  -     gabapentin (NEURONTIN) 600 mg tablet; Take 1 Tab by mouth three (3) times daily. Indications: neuropathic pain    2. Lumbar radiculopathy  -     gabapentin (NEURONTIN) 600 mg tablet; Take 1 Tab by mouth three (3) times daily. Indications: neuropathic pain    3. HNP (herniated nucleus pulposus), lumbar  -     gabapentin (NEURONTIN) 600 mg tablet; Take 1 Tab by mouth three (3) times daily. Indications: neuropathic pain    4. Chronic left-sided low back pain with left-sided sciatica  -     gabapentin (NEURONTIN) 600 mg tablet; Take 1 Tab by mouth three (3) times daily. Indications: neuropathic pain            Follow-up and Dispositions    · Return in about 6 months (around 2/19/2021) for with NP Haralson.              We have informed Srinivas Muñiz to notify us for immediate appointment if he has any worsening neurogical symptoms or if an emergency situation presents, then call [phone]

## 2020-09-22 ENCOUNTER — OFFICE VISIT (OUTPATIENT)
Dept: ORTHOPEDIC SURGERY | Age: 60
End: 2020-09-22
Payer: COMMERCIAL

## 2020-09-22 VITALS
HEART RATE: 64 BPM | WEIGHT: 259 LBS | BODY MASS INDEX: 35.08 KG/M2 | TEMPERATURE: 97.4 F | RESPIRATION RATE: 20 BRPM | DIASTOLIC BLOOD PRESSURE: 86 MMHG | OXYGEN SATURATION: 97 % | HEIGHT: 72 IN | SYSTOLIC BLOOD PRESSURE: 143 MMHG

## 2020-09-22 DIAGNOSIS — M54.42 ACUTE LEFT-SIDED LOW BACK PAIN WITH LEFT-SIDED SCIATICA: Primary | ICD-10-CM

## 2020-09-22 PROCEDURE — 99213 OFFICE O/P EST LOW 20 MIN: CPT | Performed by: NURSE PRACTITIONER

## 2020-09-22 PROCEDURE — 72100 X-RAY EXAM L-S SPINE 2/3 VWS: CPT | Performed by: NURSE PRACTITIONER

## 2020-09-22 RX ORDER — PREDNISONE 20 MG/1
TABLET ORAL
Qty: 20 TAB | Refills: 0 | Status: SHIPPED | OUTPATIENT
Start: 2020-09-22 | End: 2021-03-03

## 2020-09-22 NOTE — PROGRESS NOTES
Yaakov Reis presents today for   Chief Complaint   Patient presents with    Back Pain       Is someone accompanying this pt? no    Is the patient using any DME equipment during OV? no    Depression Screening:  3 most recent PHQ Screens 2/5/2020   Little interest or pleasure in doing things Not at all   Feeling down, depressed, irritable, or hopeless Not at all   Total Score PHQ 2 0       Learning Assessment:  Learning Assessment 12/3/2018   PRIMARY LEARNER Patient   PRIMARY LANGUAGE ENGLISH   LEARNER PREFERENCE PRIMARY READING     LISTENING     VIDEOS   ANSWERED BY patient   RELATIONSHIP SELF         Coordination of Care:  1. Have you been to the ER, urgent care clinic since your last visit? no  Hospitalized since your last visit? no    2. Have you seen or consulted any other health care providers outside of the [address] [place] [address] since your last visit? Yes, urology Include any pap smears or colon screening.  no

## 2020-09-22 NOTE — PROGRESS NOTES
Chief complaint   Chief Complaint   Patient presents with    Back Pain       History of Present Illness:  Zee Aguilera is a  62 y.o.  male who comes in today reporting increasing left-sided low back pain for the past 3 days. He states it increased without any injury or excessive activity. He states that his radiating down his leg on the left to his foot. Normally he has low back pain with radiation of pain to his left lateral calf with paresthesias down to his foot but he states now the pain is running all the way down to his foot also. He takes Voltaren through his PCP and is on Neurontin 600 mg 3 times a day which normally controls his pain to about a 3 out of 10, but his pain rating today is a 10 out of 10. He describes it is constant aching sharp pain. He retired from the [employer] on September 1, 2020 and is now doing part-time work for the [employer] in the [employer] system. He denies fever bowel bladder dysfunction. Physical Exam: The patient is a 28-year-old male well-developed well-nourished who is alert and oriented with a normal mood and affect. He has a full weightbearing antalgic gait. He is not using any assistive device he has 4 5 strength bilateral lower extremities negative straight leg raise he is tender to palpation over his left SI joint area. Assessment and Plan: This is a patient who is having a flare of what I believe is SI joint pain. I put a marker over the point of his pain and did a lumbar AP and lateral x-ray. It does shows it to be over the left SI joint area. We discussed SI joint injections. He has had a left SI selective nerve root block at L4-5 ELY injection back in December 2018 which was helpful but this pain is more over his SI joint. We are going to do a prednisone taper to see if we can calm this flare down.   He has a follow-up appointment in February we will see him back at that time if this flare does not come down he will call and we will see him for reevaluation.         Review of systems:    Past Medical History:   Diagnosis Date    BPH (benign prostatic hyperplasia)     Elevated PSA     Hematuria     Hypertension     Kidney stones      Past Surgical History:   Procedure Laterality Date    HX LITHOTRIPSY      HX WISDOM TEETH EXTRACTION       Social History     Socioeconomic History    Marital status:      Spouse name: Not on file    Number of children: Not on file    Years of education: Not on file    Highest education level: Not on file   Occupational History    Not on file   Social Needs    Financial resource strain: Not on file    Food insecurity     Worry: Not on file     Inability: Not on file    Transportation needs     Medical: Not on file     Non-medical: Not on file   Tobacco Use    Smoking status: Never Smoker    Smokeless tobacco: Current User     Types: Chew   Substance and Sexual Activity    Alcohol use: No    Drug use: No    Sexual activity: Not on file   Lifestyle    Physical activity     Days per week: Not on file     Minutes per session: Not on file    Stress: Not on file   Relationships    Social connections     Talks on phone: Not on file     Gets together: Not on file     Attends Orthodoxy service: Not on file     Active member of club or organization: Not on file     Attends meetings of clubs or organizations: Not on file     Relationship status: Not on file    Intimate partner violence     Fear of current or ex partner: Not on file     Emotionally abused: Not on file     Physically abused: Not on file     Forced sexual activity: Not on file   Other Topics Concern     Service Not Asked    Blood Transfusions Not Asked    Caffeine Concern Not Asked    Occupational Exposure Not Asked   [place] Hazards Not Asked    Sleep Concern Not Asked    Stress Concern Not Asked    Weight Concern Not Asked    Special Diet Not Asked    Back Care Not Asked    Exercise Not Asked    Bike Helmet Not Asked    Seat Belt Not Asked    Self-Exams Not Asked   Social History Narrative    Not on file     Family History   Problem Relation Age of Onset    Diabetes Mother     Other Mother         pancreas issues    Hypertension Father     Diabetes Father        Physical Exam:  Visit Vitals  BP (!) 143/86 (BP 1 Location: Left arm, BP Patient Position: Sitting) Comment: pt asymptomatic, NP aware   Pulse 64   Temp 97.4 °F (36.3 °C) (Skin)   Resp 20   Ht 6' (1.829 m)   Wt 259 lb (117.5 kg)   SpO2 97% Comment: RA   BMI 35.13 kg/m²     Pain Scale: 10 - Worst pain ever/10       has been . reviewed and is appropriate          Diagnoses and all orders for this visit:    1. Acute left-sided low back pain with left-sided sciatica  -     AMB POC XRAY, SPINE, LUMBOSACRAL; 2 O    Other orders  -     predniSONE (DELTASONE) 20 mg tablet; Take 3 tabs po x 3 days, then 2 tabs po x 3 days, then 1 tabs po x 3 days, then 1/2 tab po x 4 day            Follow-up and Dispositions    · Return for has f/u in 2/2020.              We have informed [facility] to notify us for immediate appointment if he has any worsening neurogical symptoms or if an emergency situation presents, then call 911

## 2021-02-17 ENCOUNTER — OFFICE VISIT (OUTPATIENT)
Dept: ORTHOPEDIC SURGERY | Age: 61
End: 2021-02-17
Payer: COMMERCIAL

## 2021-02-17 VITALS
HEART RATE: 83 BPM | TEMPERATURE: 97.9 F | OXYGEN SATURATION: 95 % | DIASTOLIC BLOOD PRESSURE: 81 MMHG | BODY MASS INDEX: 36.48 KG/M2 | SYSTOLIC BLOOD PRESSURE: 126 MMHG | WEIGHT: 269 LBS

## 2021-02-17 DIAGNOSIS — M51.26 HNP (HERNIATED NUCLEUS PULPOSUS), LUMBAR: ICD-10-CM

## 2021-02-17 DIAGNOSIS — M54.16 LUMBAR RADICULOPATHY: ICD-10-CM

## 2021-02-17 DIAGNOSIS — M51.36 DDD (DEGENERATIVE DISC DISEASE), LUMBAR: Primary | ICD-10-CM

## 2021-02-17 PROCEDURE — 99214 OFFICE O/P EST MOD 30 MIN: CPT | Performed by: PHYSICAL MEDICINE & REHABILITATION

## 2021-02-17 RX ORDER — GABAPENTIN 800 MG/1
800 TABLET ORAL
Qty: 90 TAB | Refills: 1 | Status: SHIPPED
Start: 2021-02-17 | End: 2022-02-16

## 2021-02-17 RX ORDER — NAPROXEN 500 MG/1
500 TABLET ORAL 2 TIMES DAILY WITH MEALS
Qty: 30 TAB | Refills: 0 | Status: SHIPPED | OUTPATIENT
Start: 2021-02-17 | End: 2021-03-03

## 2021-02-17 RX ORDER — METHYLPREDNISOLONE 4 MG/1
TABLET ORAL
Qty: 1 DOSE PACK | Refills: 0 | Status: SHIPPED | OUTPATIENT
Start: 2021-02-17 | End: 2021-03-03

## 2021-02-17 NOTE — LETTER
2/17/2021 Patient: Soham Marinelli YOB: 1960 Date of Visit: 2/17/2021 Kenroy Keller MD 
[place] [id number] [address] [address] Via Fax: [phone] Dear Kenroy Keller MD, Thank you for referring Mr. Gloria Giles to [address] for evaluation. My notes for this consultation are attached. If you have questions, please do not hesitate to call me. I look forward to following your patient along with you. Sincerely, Kim Gross MD

## 2021-02-17 NOTE — PROGRESS NOTES
[facility]  [address]  Jeffrey Gallegos, [address]  Phone: [phone]  Fax: [phone]        PROGRESS NOTE      HISTORY OF PRESENT ILLNESS:  The patient is a 62 y.o. male and was seen today for follow up of progressive left foot paraesthesias. Previously, he was seen for low back pain with paraesthesias extending into the LLE to the great toe. He initially had low back pain radiating into the RLE medially from the groin to the ankle. His primary complaint at this time is paraesthesias. He denies specific injury or trauma. He denies hx of surgery or PT. Pt underwent a left-sided S1 SNRB and an L4/5 epidural on 12/18/18 with good relief. He completed MDP x 2 and took Norco without relief. Pt denies fever, weight loss, or skin changes. Pt denies change in bowel or bladder habits. Note from Leticia Engel NP dated 11/14/18 indicating patient was seen with c/o low back pain into the left buttocks, extending into the LLE in an S1 distribution for around 11 days. No injury. He was treated with MDP by his PCP without much relief. He was started on Neurontin qhs and given an Rx for Norco and set up for an MRI. The patient is RHD. Preliminary reading of lumbar plain films revealed: mild disc space narrowing at L2/3 L4/5 and L5/S1. Small anterior osteophytes noted at L3 and L4. No acute pathology identified. Lumbar spine MRI dated 11/23/18 reviewed. Per report, moderate-sized superiorly directed extrusion at the L4/L5 level with resultant moderate spinal canal narrowing, as described. Moderate size left paracentral extrusion at the L5/S1 level which impinges upon the traversing left S1 nerve root. Multilevel degenerative changes as above. Multilevel foraminal stenosis, most pronounced and moderate right L3/L4, severe and right L4/L5, and moderate to severe at left L5/S1 levels. Please see report for level by level description.  Nonspecific 1 cm T1 and T2 hypointense lesion in the L1 vertebral body. Suggest further characterization with contrast-enhanced MRI or CT. Likely bilateral renal cysts. At his last clinical appointment, I provided him with refills of Neurontin to 600 mg TID.      The patient returns today with left-sided lower back pain radiating into the LLE in a L5 distribution to the foot involving digits 3-5. He rates his pain 7/10, previously 1/10. His pain is exacerbated by standing and walking. His pain is relieved with sitting. Positive shopping cart sign. Pt treated with Prednisone in 9/2020 with benefit. His pain progressively recurred about 1 month ago. He continues on Neurontin 600 mg TID. Pt denies change in bowel or bladder habits. Pt denies h/o stomach ulcers or bleeding disorders. Note from Michael Sinclair NP dated 9/22/2020 indicating patient was seen with c/o increase in left-sided low back pain radiating into the LLE to the foot x 3 days without trauma. Indicated his pain was 10/10. Indicated his baseline pain was 3/10. Started him on Prednisone.  reviewed. Body mass index is 36.48 kg/m².     PCP: Key Tolentino MD      Past Medical History:   Diagnosis Date    BPH (benign prostatic hyperplasia)     Elevated PSA     Hematuria     Hypertension     Kidney stones         Social History     Socioeconomic History    Marital status:      Spouse name: Not on file    Number of children: Not on file    Years of education: Not on file    Highest education level: Not on file   Occupational History    Not on file   Social Needs    Financial resource strain: Not on file    Food insecurity     Worry: Not on file     Inability: Not on file    Transportation needs     Medical: Not on file     Non-medical: Not on file   Tobacco Use    Smoking status: Never Smoker    Smokeless tobacco: Current User     Types: Chew   Substance and Sexual Activity    Alcohol use: No    Drug use: No    Sexual activity: Not on file   Lifestyle    Physical activity     Days per week: Not on file     Minutes per session: Not on file    Stress: Not on file   Relationships    Social connections     Talks on phone: Not on file     Gets together: Not on file     Attends Quaker service: Not on file     Active member of club or organization: Not on file     Attends meetings of clubs or organizations: Not on file     Relationship status: Not on file    Intimate partner violence     Fear of current or ex partner: Not on file     Emotionally abused: Not on file     Physically abused: Not on file     Forced sexual activity: Not on file   Other Topics Concern     Service Not Asked    Blood Transfusions Not Asked    Caffeine Concern Not Asked    Occupational Exposure Not Asked   Terie Hylan Hazards Not Asked    Sleep Concern Not Asked    Stress Concern Not Asked    Weight Concern Not Asked    Special Diet Not Asked    Back Care Not Asked    Exercise Not Asked    Bike Helmet Not Asked   [address] Not Asked    Self-Exams Not Asked   Social History Narrative    Not on file       Current Outpatient Medications   Medication Sig Dispense Refill    methylPREDNISolone (MEDROL DOSEPACK) 4 mg tablet Per dose pack instructions 1 Dose Pack 0    naproxen (NAPROSYN) 500 mg tablet Take 1 Tab by mouth two (2) times daily (with meals). Start after completion of Medrol Dose Pack. 30 Tab 0    alfuzosin SR (UROXATRAL) 10 mg SR tablet TAKE ONE TABLET BY MOUTH DAILY AFTER DINNER 90 Tab 0    predniSONE (DELTASONE) 20 mg tablet Take 3 tabs po x 3 days, then 2 tabs po x 3 days, then 1 tabs po x 3 days, then 1/2 tab po x 4 day 20 Tab 0    gabapentin (NEURONTIN) 600 mg tablet Take 1 Tab by mouth three (3) times daily. Indications: neuropathic pain 270 Tab 1    diclofenac EC (VOLTAREN) 75 mg EC tablet 75 mg two (2) times a day. 1    omeprazole (PRILOSEC) 40 mg capsule Take 40 mg by mouth daily.  valACYclovir (VALTREX) 500 mg tablet Take 500 mg by mouth daily.       sertraline (ZOLOFT) 100 mg tablet 100 mg two (2) times a day.  atorvastatin (LIPITOR) 10 mg tablet Take  by mouth daily.  lisinopril (PRINIVIL, ZESTRIL) 20 mg tablet Take  by mouth daily. No Known Allergies       PHYSICAL EXAMINATION    Visit Vitals  BP (!) 144/90 (BP 1 Location: Left arm, BP Patient Position: Sitting)   Pulse 83   Temp 97.9 °F (36.6 °C) (Skin)   Wt 269 lb (122 kg)   SpO2 95%   BMI 36.48 kg/m²       CONSTITUTIONAL: NAD, A&O x 3  SENSATION: Intact to light touch throughout  RANGE OF MOTION: The patient has full passive range of motion in all four extremities. MOTOR:  Straight Leg Raise: Negative, bilateral               Hip Flex Knee Ext Knee Flex Ankle DF GTE Ankle PF Tone   Right +4/5 +4/5 +4/5 +4/5 +4/5 +4/5 +4/5   Left +4/5 +4/5 +4/5 +4/5 +4/5 +4/5 +4/5       ASSESSMENT   Diagnoses and all orders for this visit:    1. DDD (degenerative disc disease), lumbar  -     methylPREDNISolone (MEDROL DOSEPACK) 4 mg tablet; Per dose pack instructions  -     naproxen (NAPROSYN) 500 mg tablet; Take 1 Tab by mouth two (2) times daily (with meals). Start after completion of Medrol Dose Pack.  -     gabapentin (Neurontin) 800 mg tablet; Take 1 Tab by mouth three (3) times daily (with meals). Max Daily Amount: 2,400 mg. Indications: neuropathic pain    2. Lumbar radiculopathy  -     methylPREDNISolone (MEDROL DOSEPACK) 4 mg tablet; Per dose pack instructions  -     naproxen (NAPROSYN) 500 mg tablet; Take 1 Tab by mouth two (2) times daily (with meals). Start after completion of Medrol Dose Pack.  -     gabapentin (Neurontin) 800 mg tablet; Take 1 Tab by mouth three (3) times daily (with meals). Max Daily Amount: 2,400 mg. Indications: neuropathic pain    3. HNP (herniated nucleus pulposus), lumbar  -     methylPREDNISolone (MEDROL DOSEPACK) 4 mg tablet; Per dose pack instructions  -     naproxen (NAPROSYN) 500 mg tablet; Take 1 Tab by mouth two (2) times daily (with meals).  Start after completion of Medrol Dose Pack.  -     gabapentin (Neurontin) 800 mg tablet; Take 1 Tab by mouth three (3) times daily (with meals). Max Daily Amount: 2,400 mg. Indications: neuropathic pain        IMPRESSION AND PLAN:  Patient returns to the office today with c/o left-sided lower back pain radiating into the LLE in a L5 distribution to the foot involving digits 3-5. Multiple treatment options were discussed. I offered PT, pt declined. I offered blocks, pt deferred. I will start him on Medrol Dosepak followed by Naprosyn 500 mg BID. I will increase his Neurontin from 600 mg TID to 800 mg TID. Patient advised to call the office if intolerant to higher dose. Patient is neurologically intact. I will see the patient back in 1 month's time or earlier if needed. Written by Linwood Masterson, as dictated by Jimi Banks MD  I examined the patient, reviewed and agree with the note.

## 2021-03-15 NOTE — PROGRESS NOTES
[facility]  [address]   Phone: [phone]  Fax: [phone]        PROGRESS NOTE      HISTORY OF PRESENT ILLNESS:  The patient is a 62 y.o. male and was seen today for follow up of left-sided lower back pain radiating into the LLE in a L5 distribution to the foot involving digits 3-5. Previously, he was seen for progressive left foot paraesthesias. Previously, he was seen for low back pain with paraesthesias extending into the LLE to the great toe. He initially had low back pain radiating into the RLE medially from the groin to the ankle. His primary complaint at this time is paraesthesias. He denies specific injury or trauma. He denies hx of surgery or PT. Pt underwent a left-sided S1 SNRB and an L4/5 epidural on 12/18/18 with good relief. Pt treated with Prednisone in 9/2020 with benefit. Pt failed NEURONTIN 800 mg TID secondary to minimal benefit. Pt denies fever, weight loss, or skin changes. Pt denies change in bowel or bladder habits. The patient is RHD. Note from Leticia Engel NP dated 11/14/18 indicating patient was seen with c/o low back pain into the left buttocks, extending into the LLE in an S1 distribution for around 11 days. No injury. He was treated with MDP by his PCP without much relief. He was started on Neurontin qhs and given an Rx for Norco and set up for an MRI. Note from Leonor Mcclendon NP dated 9/22/2020 indicating patient was seen with c/o increase in left-sided low back pain radiating into the LLE to the foot x 3 days without trauma. Indicated his pain was 10/10. Indicated his baseline pain was 3/10. Started him on Prednisone. Preliminary reading of lumbar plain films revealed: mild disc space narrowing at L2/3 L4/5 and L5/S1. Small anterior osteophytes noted at L3 and L4. No acute pathology identified. Lumbar spine MRI dated 11/23/18 reviewed.  Per report, moderate-sized superiorly directed extrusion at the L4/L5 level with resultant moderate spinal canal narrowing, as described. Moderate size left paracentral extrusion at the L5/S1 level which impinges upon the traversing left S1 nerve root. Multilevel degenerative changes as above. Multilevel foraminal stenosis, most pronounced and moderate right L3/L4, severe and right L4/L5, and moderate to severe at left L5/S1 levels. Please see report for level by level description. Nonspecific 1 cm T1 and T2 hypointense lesion in the L1 vertebral body. Suggest further characterization with contrast-enhanced MRI or CT. Likely bilateral renal cysts. At his last clinical appointment, I offered PT, pt declined. I offered blocks, pt deferred. I started him on Medrol Dosepak followed by Naprosyn 500 mg BID. I increased his Neurontin from 600 mg TID to 800 mg TID. The patient returns today and reports pain location and distribution remains unchanged. He rates his pain 4-8/10, previously 7/10. Pt completed the MDP with temporary benefit. He is tolerating the increased dose of Neurontin 800 mg TID without additional benefit. Pt takes Diclofenac. Pt denies change in bowel or bladder habits. Patient denies history of glaucoma.  reviewed. Body mass index is 37.74 kg/m².     PCP: Anabela Ramirez MD      Past Medical History:   Diagnosis Date    BPH (benign prostatic hyperplasia)     Elevated PSA     Hematuria     Hypertension     Kidney stones         Social History     Socioeconomic History    Marital status:      Spouse name: Not on file    Number of children: Not on file    Years of education: Not on file    Highest education level: Not on file   Occupational History    Not on file   Social Needs    Financial resource strain: Not on file    Food insecurity     Worry: Not on file     Inability: Not on file    Transportation needs     Medical: Not on file     Non-medical: Not on file   Tobacco Use    Smoking status: Never Smoker    Smokeless tobacco: Current User Types: Chew   Substance and Sexual Activity    Alcohol use: No    Drug use: No    Sexual activity: Not on file   Lifestyle    Physical activity     Days per week: Not on file     Minutes per session: Not on file    Stress: Not on file   Relationships    Social connections     Talks on phone: Not on file     Gets together: Not on file     Attends Roman Catholic service: Not on file     Active member of club or organization: Not on file     Attends meetings of clubs or organizations: Not on file     Relationship status: Not on file    Intimate partner violence     Fear of current or ex partner: Not on file     Emotionally abused: Not on file     Physically abused: Not on file     Forced sexual activity: Not on file   Other Topics Concern     Service Not Asked    Blood Transfusions Not Asked    Caffeine Concern Not Asked    Occupational Exposure Not Asked   Vallorie Sultana Hazards Not Asked    Sleep Concern Not Asked    Stress Concern Not Asked    Weight Concern Not Asked    Special Diet Not Asked    Back Care Not Asked    Exercise Not Asked    Bike Helmet Not Asked   [address] Not Asked    Self-Exams Not Asked   Social History Narrative    Not on file       Current Outpatient Medications   Medication Sig Dispense Refill    gabapentin (Neurontin) 800 mg tablet Take 1 Tab by mouth three (3) times daily (with meals). Max Daily Amount: 2,400 mg. Indications: neuropathic pain 90 Tab 1    alfuzosin SR (UROXATRAL) 10 mg SR tablet TAKE ONE TABLET BY MOUTH DAILY AFTER DINNER 90 Tab 0    diclofenac EC (VOLTAREN) 75 mg EC tablet 75 mg two (2) times a day. 1    omeprazole (PRILOSEC) 40 mg capsule Take 40 mg by mouth daily.  valACYclovir (VALTREX) 500 mg tablet Take 500 mg by mouth daily.  sertraline (ZOLOFT) 100 mg tablet 100 mg two (2) times a day.  atorvastatin (LIPITOR) 10 mg tablet Take  by mouth daily.  lisinopril (PRINIVIL, ZESTRIL) 20 mg tablet Take  by mouth daily.          No Known Allergies       PHYSICAL EXAMINATION    Visit Vitals  /88 (BP 1 Location: Left upper arm)   Pulse 74   Temp 97 °F (36.1 °C)   Resp 18   Ht 5' 10\" (1.778 m)   Wt 263 lb (119.3 kg)   SpO2 96%   BMI 37.74 kg/m²       CONSTITUTIONAL: NAD, A&O x 3  SENSATION: Intact to light touch throughout  RANGE OF MOTION: The patient has full passive range of motion in all four extremities. MOTOR:  Straight Leg Raise: Negative, bilateral                 Hip Flex Knee Ext Knee Flex Ankle DF GTE Ankle PF Tone   Right +4/5 +4/5 +4/5 +4/5 +4/5 +4/5 +4/5   Left +4/5 +4/5 +4/5 +4/5 +4/5 +4/5 +4/5       ASSESSMENT   Diagnoses and all orders for this visit:    1. DDD (degenerative disc disease), lumbar    2. Lumbar radiculopathy    3. HNP (herniated nucleus pulposus), lumbar      IMPRESSION AND PLAN:  Patient returns to the office today with c/o left-sided lower back pain radiating into the LLE in a L5 distribution to the foot involving digits 3-5. Multiple treatment options were discussed. I will wean him off of Neurontin 800 mg TID secondary to minimal benefit. I will try him on Topamax 75 mg qhs. The risks, benefits, and potential side effects of this medication were discussed. Patient understands and wishes to proceed. Patient advised to call the office if intolerant to new medication. I will order a new L spine MRI. I advised patient to bring copies of films to next visit. Patient is neurologically intact. I will see the patient back following the MRI or earlier if needed. Written by Quirino Roche, as dictated by Paul Bryan MD  I examined the patient, reviewed and agree with the note.

## 2021-03-17 ENCOUNTER — OFFICE VISIT (OUTPATIENT)
Dept: ORTHOPEDIC SURGERY | Age: 61
End: 2021-03-17
Payer: COMMERCIAL

## 2021-03-17 VITALS
OXYGEN SATURATION: 96 % | RESPIRATION RATE: 18 BRPM | HEIGHT: 70 IN | HEART RATE: 74 BPM | BODY MASS INDEX: 37.65 KG/M2 | WEIGHT: 263 LBS | DIASTOLIC BLOOD PRESSURE: 88 MMHG | TEMPERATURE: 97 F | SYSTOLIC BLOOD PRESSURE: 134 MMHG

## 2021-03-17 DIAGNOSIS — M51.26 HNP (HERNIATED NUCLEUS PULPOSUS), LUMBAR: ICD-10-CM

## 2021-03-17 DIAGNOSIS — M54.16 LUMBAR RADICULOPATHY: ICD-10-CM

## 2021-03-17 DIAGNOSIS — M51.36 DDD (DEGENERATIVE DISC DISEASE), LUMBAR: Primary | ICD-10-CM

## 2021-03-17 PROCEDURE — 99214 OFFICE O/P EST MOD 30 MIN: CPT | Performed by: PHYSICAL MEDICINE & REHABILITATION

## 2021-03-17 RX ORDER — TOPIRAMATE 25 MG/1
TABLET ORAL
Qty: 90 TAB | Refills: 1 | Status: SHIPPED | OUTPATIENT
Start: 2021-03-17 | End: 2021-08-10

## 2021-03-17 NOTE — LETTER
3/17/2021 Patient: Ian Hall YOB: 1960 Date of Visit: 3/17/2021 Ben Armendariz MD 
[place] [address] [address] [address] Via Fax: [phone] Dear Ben Armendariz MD, Thank you for referring Mr. Trino Patterson to [address] for evaluation. My notes for this consultation are attached. If you have questions, please do not hesitate to call me. I look forward to following your patient along with you. Sincerely, Santana Thibodeaux MD

## 2021-03-22 ENCOUNTER — DOCUMENTATION ONLY (OUTPATIENT)
Dept: ORTHOPEDIC SURGERY | Age: 61
End: 2021-03-22

## 2021-04-17 DIAGNOSIS — M51.36 DDD (DEGENERATIVE DISC DISEASE), LUMBAR: ICD-10-CM

## 2021-04-17 DIAGNOSIS — M54.16 LUMBAR RADICULOPATHY: ICD-10-CM

## 2021-04-17 DIAGNOSIS — M51.26 HNP (HERNIATED NUCLEUS PULPOSUS), LUMBAR: ICD-10-CM

## 2021-04-21 DIAGNOSIS — M51.26 HNP (HERNIATED NUCLEUS PULPOSUS), LUMBAR: ICD-10-CM

## 2021-04-21 DIAGNOSIS — M51.36 DDD (DEGENERATIVE DISC DISEASE), LUMBAR: ICD-10-CM

## 2021-04-21 DIAGNOSIS — M54.16 LUMBAR RADICULOPATHY: ICD-10-CM

## 2021-04-21 RX ORDER — GABAPENTIN 800 MG/1
TABLET ORAL
Qty: 90 TAB | Refills: 0 | OUTPATIENT
Start: 2021-04-21

## 2021-04-22 RX ORDER — ALFUZOSIN HYDROCHLORIDE 10 MG/1
TABLET, EXTENDED RELEASE ORAL
Qty: 90 TAB | Refills: 1 | Status: SHIPPED | OUTPATIENT
Start: 2021-04-22 | End: 2021-10-21

## 2021-04-22 NOTE — TELEPHONE ENCOUNTER
----- Message from Zack Minaya sent at 4/21/2021  6:51 PM EDT -----  Regarding: RE: Prescription Question  Contact: [phone]  It is Alfuzosin Hydrochloride 10mg , 90 tablets,  1 tablet daily.

## 2021-05-06 NOTE — PROGRESS NOTES
[facility]  [address], [place]   Phone: [phone]  Fax: [phone]        PROGRESS NOTE      HISTORY OF PRESENT ILLNESS:  The patient is a 62 y.o. male and was seen today for follow up of left-sided lower back pain radiating into the LLE in a L5 distribution to the foot involving digits 3-5. Previously, he was seen for progressive left foot paraesthesias. Previously, he was seen for low back pain with paraesthesias extending into the LLE to the great toe. He initially had low back pain radiating into the RLE medially from the groin to the ankle. His primary complaint at this time is paraesthesias. He denies specific injury or trauma. He denies hx of surgery or PT. Pt underwent a left-sided S1 SNRB and an L4/5 epidural on 12/18/18 with good relief. Pt treated with Prednisone in 9/2020 with benefit. Pt failed NEURONTIN 800 mg TID secondary to minimal benefit. Pt takes Diclofenac. Pt completed the MDP with temporary benefit. Pt denies fever, weight loss, or skin changes. Pt denies change in bowel or bladder habits. The patient is RHD. Note from Leticia Engel NP dated 11/14/18 indicating patient was seen with c/o low back pain into the left buttocks, extending into the LLE in an S1 distribution for around 11 days. No injury. He was treated with MDP by his PCP without much relief. He was started on Neurontin qhs and given an Rx for Norco and set up for an [place], NP dated 9/22/2020 indicating patient was seen with c/o increase in left-sided low back pain radiating into the LLE to the foot x 3 days without trauma. Indicated his pain was 10/10. Indicated his baseline pain was 3/10. Started him on Prednisone. Preliminary reading of lumbar plain films revealed: mild disc space narrowing at L2/3 L4/5 and L5/S1. Small anterior osteophytes noted at L3 and L4. No acute pathology identified. Lumbar spine MRI dated 11/23/18 reviewed.  Per report, moderate-sized superiorly directed extrusion at the L4/L5 level with resultant moderate spinal canal narrowing, as described. Moderate size left paracentral extrusion at the L5/S1 level which impinges upon the traversing left S1 nerve root. Multilevel degenerative changes as above. Multilevel foraminal stenosis, most pronounced and moderate right L3/L4, severe and right L4/L5, and moderate to severe at left L5/S1 levels. Please see report for level by level description. Nonspecific 1 cm T1 and T2 hypointense lesion in the L1 vertebral body. Suggest further characterization with contrast-enhanced MRI or CT. Likely bilateral renal cysts. At his last clinical appointment, I weaned him off of Neurontin 800 mg TID secondary to minimal benefit. I tried him on Topamax 75 mg qhs. I ordered a new L spine MRI. The patient returns today and reports pain location and distribution remains unchanged. He rates his pain 1-3/10, previously 4-8/10. Pt did not follow through with the L spine MRI as ordered due to financial concerns. He is tolerating the Topamax 75 mg qhs with good benefit. His pain is less intense in nature. Pt denies change in bowel or bladder habits.  reviewed. Body mass index is 36.73 kg/m².     PCP: Bryanna Ponce MD      Past Medical History:   Diagnosis Date    BPH (benign prostatic hyperplasia)     Elevated PSA     Hematuria     Hypertension     Kidney stones         Social History     Socioeconomic History    Marital status:      Spouse name: Not on file    Number of children: Not on file    Years of education: Not on file    Highest education level: Not on file   Occupational History    Not on file   Social Needs    Financial resource strain: Not on file    Food insecurity     Worry: Not on file     Inability: Not on file    Transportation needs     Medical: Not on file     Non-medical: Not on file   Tobacco Use    Smoking status: Never Smoker    Smokeless tobacco: Current User     Types: Chew   Substance and Sexual Activity    Alcohol use: No    Drug use: No    Sexual activity: Not on file   Lifestyle    Physical activity     Days per week: Not on file     Minutes per session: Not on file    Stress: Not on file   Relationships    Social connections     Talks on phone: Not on file     Gets together: Not on file     Attends Rastafari service: Not on file     Active member of club or organization: Not on file     Attends meetings of clubs or organizations: Not on file     Relationship status: Not on file    Intimate partner violence     Fear of current or ex partner: Not on file     Emotionally abused: Not on file     Physically abused: Not on file     Forced sexual activity: Not on file   Other Topics Concern     Service Not Asked    Blood Transfusions Not Asked    Caffeine Concern Not Asked    Occupational Exposure Not Asked   [address] Hazards Not Asked    Sleep Concern Not Asked    Stress Concern Not Asked    Weight Concern Not Asked    Special Diet Not Asked    Back Care Not Asked    Exercise Not Asked    Bike Helmet Not Asked    Seat Belt Not Asked    Self-Exams Not Asked   Social History Narrative    Not on file       Current Outpatient Medications   Medication Sig Dispense Refill    alfuzosin SR (UROXATRAL) 10 mg SR tablet TAKE ONE TABLET BY MOUTH DAILY AFTER DINNER 90 Tab 1    topiramate (TOPAMAX) 25 mg tablet 3 tabs PO QHS 90 Tab 1    diclofenac EC (VOLTAREN) 75 mg EC tablet 75 mg two (2) times a day. 1    omeprazole (PRILOSEC) 40 mg capsule Take 40 mg by mouth daily.  valACYclovir (VALTREX) 500 mg tablet Take 500 mg by mouth daily.  sertraline (ZOLOFT) 100 mg tablet 100 mg two (2) times a day.  atorvastatin (LIPITOR) 10 mg tablet Take  by mouth daily.  lisinopril (PRINIVIL, ZESTRIL) 20 mg tablet Take  by mouth daily.  gabapentin (Neurontin) 800 mg tablet Take 1 Tab by mouth three (3) times daily (with meals). Max Daily Amount: 2,400 mg. Indications: neuropathic pain 90 Tab 1       No Known Allergies       PHYSICAL EXAMINATION    Visit Vitals  BP (!) 122/90 (BP 1 Location: Left upper arm)   Pulse 65   Temp 98.2 °F (36.8 °C)   Resp 18   Ht 5' 10\" (1.778 m)   Wt 256 lb (116.1 kg)   SpO2 96%   BMI 36.73 kg/m²       CONSTITUTIONAL: NAD, A&O x 3  SENSATION: Intact to light touch throughout  RANGE OF MOTION: The patient has full passive range of motion in all four extremities. MOTOR:  Straight Leg Raise: Negative, bilateral                 Hip Flex Knee Ext Knee Flex Ankle DF GTE Ankle PF Tone   Right +4/5 +4/5 +4/5 +4/5 +4/5 +4/5 +4/5   Left +4/5 +4/5 +4/5 +4/5 +4/5 +4/5 +4/5       ASSESSMENT   Diagnoses and all orders for this visit:    1. Lumbar radiculopathy    2. DDD (degenerative disc disease), lumbar    3. HNP (herniated nucleus pulposus), lumbar      IMPRESSION AND PLAN:  Patient returns to the office today with c/o left-sided lower back pain radiating into the LLE in a L5 distribution to the foot involving digits 3-5. Multiple treatment options were discussed. I will increase his Topamax from 75 mg qhs to 75 mg BID. Patient advised to call the office if intolerant to higher dose. Patient is neurologically intact. I will see the patient back in 1 month's time or earlier if needed. Written by Amado Garcia, as dictated by Cooper Jolly MD  I examined the patient, reviewed and agree with the note.

## 2021-05-07 ENCOUNTER — OFFICE VISIT (OUTPATIENT)
Dept: ORTHOPEDIC SURGERY | Age: 61
End: 2021-05-07
Payer: COMMERCIAL

## 2021-05-07 VITALS
DIASTOLIC BLOOD PRESSURE: 90 MMHG | HEIGHT: 70 IN | RESPIRATION RATE: 18 BRPM | WEIGHT: 256 LBS | SYSTOLIC BLOOD PRESSURE: 122 MMHG | BODY MASS INDEX: 36.65 KG/M2 | OXYGEN SATURATION: 96 % | TEMPERATURE: 98.2 F | HEART RATE: 65 BPM

## 2021-05-07 DIAGNOSIS — M51.36 DDD (DEGENERATIVE DISC DISEASE), LUMBAR: ICD-10-CM

## 2021-05-07 DIAGNOSIS — M51.26 HNP (HERNIATED NUCLEUS PULPOSUS), LUMBAR: ICD-10-CM

## 2021-05-07 DIAGNOSIS — M54.16 LUMBAR RADICULOPATHY: Primary | ICD-10-CM

## 2021-05-07 PROCEDURE — 99214 OFFICE O/P EST MOD 30 MIN: CPT | Performed by: PHYSICAL MEDICINE & REHABILITATION

## 2021-05-07 RX ORDER — TOPIRAMATE 25 MG/1
TABLET ORAL
Qty: 180 TAB | Refills: 1 | Status: SHIPPED
Start: 2021-05-07 | End: 2021-06-07 | Stop reason: SDUPTHER

## 2021-05-07 NOTE — LETTER
5/7/2021 Patient: Paulette Lim YOB: 1960 Date of Visit: 5/7/2021 Catina Hahn MD 
[place] [id number] [address] [address] Via Fax: [phone] Dear Catina Hahn MD, Thank you for referring Mr. Raphael Callejas to [address] for evaluation. My notes for this consultation are attached. If you have questions, please do not hesitate to call me. I look forward to following your patient along with you. Sincerely, Nidia Shone, MD

## 2021-06-04 NOTE — PROGRESS NOTES
[facility]  [address], [place]   Phone: [phone]  Fax: [phone]        PROGRESS NOTE      HISTORY OF PRESENT ILLNESS:  The patient is a 62 y.o. male and was seen today for follow up of left-sided lower back pain radiating into the LLE in a L5 distribution to the foot involving digits 3-5. Previously, he was seen for progressive left foot paraesthesias. Previously, he was seen for low back pain with paraesthesias extending into the LLE to the great toe. He initially had low back pain radiating into the RLE medially from the groin to the ankle. His primary complaint at this time is paraesthesias. He denies specific injury or trauma. He denies hx of surgery or PT. Pt underwent a left-sided S1 SNRB and an L4/5 epidural on 12/18/18 with good relief. Pt treated with Prednisone in 9/2020 with benefit. Pt failed NEURONTIN 800 mg TID secondary to minimal benefit. Pt takes Diclofenac. Pt completed the MDP with temporary benefit. Pt denies fever, weight loss, or skin changes. Pt denies change in bowel or bladder habits. The patient is RHD. Note from Leticia Engel NP dated 11/14/18 indicating patient was seen with c/o low back pain into the left buttocks, extending into the LLE in an S1 distribution for around 11 days. No injury. He was treated with MDP by his PCP without much relief. He was started on Neurontin qhs and given an Rx for Norco and set up for an [place], NP dated 9/22/2020 indicating patient was seen with c/o increase in left-sided low back pain radiating into the LLE to the foot x 3 days without trauma. Indicated his pain was 10/10. Indicated his baseline pain was 3/10. Started him on Prednisone. Preliminary reading of lumbar plain films revealed: mild disc space narrowing at L2/3 L4/5 and L5/S1. Small anterior osteophytes noted at L3 and L4. No acute pathology identified. Lumbar spine MRI dated 11/23/18 reviewed.  Per report, moderate-sized superiorly directed extrusion at the L4/L5 level with resultant moderate spinal canal narrowing, as described. Moderate size left paracentral extrusion at the L5/S1 level which impinges upon the traversing left S1 nerve root. Multilevel degenerative changes as above. Multilevel foraminal stenosis, most pronounced and moderate right L3/L4, severe and right L4/L5, and moderate to severe at left L5/S1 levels. Please see report for level by level description. Nonspecific 1 cm T1 and T2 hypointense lesion in the L1 vertebral body. Suggest further characterization with contrast-enhanced MRI or CT. Likely bilateral renal cysts. At his last clinical appointment, I increased his Topamax from 75 mg qhs to 75 mg BID. Patient advised to call the office if intolerant to higher dose. The patient returns today and reports pain location and distribution remains unchanged. He rates his pain 1-3/10, unchanged. He is tolerating the increased dose of Topamax 75 mg BID with improvement in coverage throughout the day. Pt denies change in bowel or bladder habits.  reviewed. Body mass index is 36.88 kg/m².     PCP: Bjorn Sprague MD      Past Medical History:   Diagnosis Date    BPH (benign prostatic hyperplasia)     Elevated PSA     Hematuria     Hypertension     Kidney stones         Social History     Socioeconomic History    Marital status:      Spouse name: Not on file    Number of children: Not on file    Years of education: Not on file    Highest education level: Not on file   Occupational History    Not on file   Tobacco Use    Smoking status: Never Smoker    Smokeless tobacco: Current User     Types: Chew   Vaping Use    Vaping Use: Never used   Substance and Sexual Activity    Alcohol use: No    Drug use: No    Sexual activity: Not on file   Other Topics Concern     Service Not Asked    Blood Transfusions Not Asked    Caffeine Concern Not Asked    Occupational Exposure Not Asked   M[place] Hazards Not Asked    Sleep Concern Not Asked    Stress Concern Not Asked    Weight Concern Not Asked    Special Diet Not Asked    Back Care Not Asked    Exercise Not Asked    Bike Helmet Not Asked   [address] Not Asked    Self-Exams Not Asked   Social History Narrative    Not on file     Social Determinants of Health     Financial Resource Strain:     Difficulty of Paying Living Expenses:    Food Insecurity:     Worried About Running Out of Food in the Last Year:     [address] in the Last Year:    Transportation Needs:     Lack of Transportation (Medical):  Lack of Transportation (Non-Medical):    Physical Activity:     Days of Exercise per Week:     Minutes of Exercise per Session:    Stress:     Feeling of Stress :    Social Connections:     Frequency of Communication with Friends and Family:     Frequency of Social Gatherings with Friends and Family:     Attends Spiritism Services:     Active Member of Clubs or Organizations:     Attends Club or Organization Meetings:     Marital Status:    Intimate Partner Violence:     Fear of Current or Ex-Partner:     Emotionally Abused:     Physically Abused:     Sexually Abused:        Current Outpatient Medications   Medication Sig Dispense Refill    alfuzosin SR (UROXATRAL) 10 mg SR tablet TAKE ONE TABLET BY MOUTH DAILY AFTER DINNER 90 Tab 1    topiramate (TOPAMAX) 25 mg tablet 3 tabs PO QHS 90 Tab 1    diclofenac EC (VOLTAREN) 75 mg EC tablet 75 mg two (2) times a day. 1    omeprazole (PRILOSEC) 40 mg capsule Take 40 mg by mouth daily.  valACYclovir (VALTREX) 500 mg tablet Take 500 mg by mouth daily.  sertraline (ZOLOFT) 100 mg tablet 100 mg two (2) times a day.  atorvastatin (LIPITOR) 10 mg tablet Take  by mouth daily.  lisinopril (PRINIVIL, ZESTRIL) 20 mg tablet Take  by mouth daily.       gabapentin (Neurontin) 800 mg tablet Take 1 Tab by mouth three (3) times daily (with meals). Max Daily Amount: 2,400 mg. Indications: neuropathic pain 90 Tab 1       No Known Allergies       PHYSICAL EXAMINATION    Visit Vitals  /84 (BP 1 Location: Left upper arm)   Pulse 74   Temp 97.9 °F (36.6 °C)   Resp 20   Wt 257 lb (116.6 kg)   SpO2 96%   BMI 36.88 kg/m²       CONSTITUTIONAL: NAD, A&O x 3  SENSATION: Intact to light touch throughout  RANGE OF MOTION: The patient has full passive range of motion in all four extremities. MOTOR:  Straight Leg Raise: Negative, bilateral                 Hip Flex Knee Ext Knee Flex Ankle DF GTE Ankle PF Tone   Right +4/5 +4/5 +4/5 +4/5 +4/5 +4/5 +4/5   Left +4/5 +4/5 +4/5 +4/5 +4/5 +4/5 +4/5       ASSESSMENT   Diagnoses and all orders for this visit:    1. Lumbar radiculopathy    2. DDD (degenerative disc disease), lumbar    3. HNP (herniated nucleus pulposus), lumbar        IMPRESSION AND PLAN:  Patient returns to the office today with c/o left-sided lower back pain radiating into the LLE in a L5 distribution to the foot involving digits 3-5. Multiple treatment options were discussed. I offered to try him on a different neuropathic medication, pt declined. Patient wished to continue his current treatment. I provided him refills of Topamax 75 mg BID. Patient is neurologically intact. I will see the patient back in 3 month's time or earlier if needed. Written by Gisselle Schneider, as dictated by Meghan Treviño MD  I examined the patient, reviewed and agree with the note.

## 2021-06-07 ENCOUNTER — OFFICE VISIT (OUTPATIENT)
Dept: ORTHOPEDIC SURGERY | Age: 61
End: 2021-06-07
Payer: COMMERCIAL

## 2021-06-07 VITALS
WEIGHT: 257 LBS | TEMPERATURE: 97.9 F | SYSTOLIC BLOOD PRESSURE: 127 MMHG | DIASTOLIC BLOOD PRESSURE: 84 MMHG | RESPIRATION RATE: 20 BRPM | HEART RATE: 74 BPM | OXYGEN SATURATION: 96 % | BODY MASS INDEX: 36.88 KG/M2

## 2021-06-07 DIAGNOSIS — M51.36 DDD (DEGENERATIVE DISC DISEASE), LUMBAR: ICD-10-CM

## 2021-06-07 DIAGNOSIS — M54.16 LUMBAR RADICULOPATHY: Primary | ICD-10-CM

## 2021-06-07 DIAGNOSIS — M51.26 HNP (HERNIATED NUCLEUS PULPOSUS), LUMBAR: ICD-10-CM

## 2021-06-07 PROCEDURE — 99213 OFFICE O/P EST LOW 20 MIN: CPT | Performed by: PHYSICAL MEDICINE & REHABILITATION

## 2021-06-07 RX ORDER — TOPIRAMATE 25 MG/1
TABLET ORAL
Qty: 540 TABLET | Refills: 0 | Status: SHIPPED | OUTPATIENT
Start: 2021-06-07 | End: 2021-08-10 | Stop reason: SDUPTHER

## 2021-06-07 NOTE — LETTER
6/7/2021    Patient: Bettina Rodríguez   YOB: 1960   Date of Visit: 6/7/2021     Shawna Garcia MD  [address] [address]  Via Fax: [phone]    Dear Shawna Garcia MD,      Thank you for referring Mr. Rose Steward to [address] for evaluation. My notes for this consultation are attached. If you have questions, please do not hesitate to call me. I look forward to following your patient along with you.       Sincerely,    Zehra Maravilla MD

## 2021-08-10 DIAGNOSIS — M51.26 HNP (HERNIATED NUCLEUS PULPOSUS), LUMBAR: ICD-10-CM

## 2021-08-10 DIAGNOSIS — M54.16 LUMBAR RADICULOPATHY: ICD-10-CM

## 2021-08-10 DIAGNOSIS — M51.36 DDD (DEGENERATIVE DISC DISEASE), LUMBAR: ICD-10-CM

## 2021-08-10 RX ORDER — TOPIRAMATE 25 MG/1
TABLET ORAL
Qty: 540 TABLET | Refills: 0 | Status: SHIPPED | OUTPATIENT
Start: 2021-08-10 | End: 2022-03-14

## 2021-08-10 RX ORDER — TOPIRAMATE 25 MG/1
TABLET ORAL
Qty: 180 TABLET | Refills: 0 | OUTPATIENT
Start: 2021-08-10

## 2021-08-10 NOTE — TELEPHONE ENCOUNTER
Per pharmacy - they never received the Rx we sent on 6/7/21. Med has been pended again.     Requested Prescriptions     Pending Prescriptions Disp Refills    topiramate (TOPAMAX) 25 mg tablet 540 Tablet 0     Sig: 3 tabs PO BID     Refused Prescriptions Disp Refills    topiramate (TOPAMAX) 25 mg tablet [Pharmacy Med Name: TOPIRAMATE 25 MG TABLET] 180 Tablet 0     Sig: TAKE THREE TABLETS BY MOUTH TWICE A DAY     Refused By: More ZENG     Reason for Refusal: Patient has requested refill too soon

## 2021-09-10 NOTE — PROGRESS NOTES
[facility]  [address], [place]  Phone: [phone]  Fax: [phone]        PROGRESS NOTE      HISTORY OF PRESENT ILLNESS:  The patient is a 62 y.o. male and was seen today for follow up of left-sided lower back pain radiating into the LLE in a L5 distribution to the foot involving digits 3-5. Previously, he was seen for progressive left foot paraesthesias. Previously, he was seen for low back pain with paraesthesias extending into the LLE to the great toe. He initially had low back pain radiating into the RLE medially from the groin to the ankle. His primary complaint at this time is paraesthesias. He denies specific injury or trauma. He denies hx of surgery or PT. Pt underwent a left-sided S1 SNRB and an L4/5 epidural on 12/18/18 with good relief. Pt treated with Prednisone in 9/2020 with benefit. Pt failed NEURONTIN 800 mg TID secondary to minimal benefit. Pt takes Diclofenac. Pt completed the MDP with temporary benefit. Pt denies fever, weight loss, or skin changes. Pt denies change in bowel or bladder habits. The patient is RHD. Note from Leticia Engel NP dated 11/14/18 indicating patient was seen with c/o low back pain into the left buttocks, extending into the LLE in an S1 distribution for around 11 days. No injury. He was treated with MDP by his PCP without much relief. He was started on Neurontin qhs and given an Rx for Norco and set up for an [facility], NP dated 9/22/2020 indicating patient was seen with c/o increase in left-sided low back pain radiating into the LLE to the foot x 3 days without trauma. Indicated his pain was 10/10. Indicated his baseline pain was 3/10. Started him on Prednisone. Preliminary reading of lumbar plain films revealed: mild disc space narrowing at L2/3 L4/5 and L5/S1. Small anterior osteophytes noted at L3 and L4. No acute pathology identified. Lumbar spine MRI dated 11/23/18 reviewed.  Per report, moderate-sized superiorly directed extrusion at the L4/L5 level with resultant moderate spinal canal narrowing, as described. Moderate size left paracentral extrusion at the L5/S1 level which impinges upon the traversing left S1 nerve root. Multilevel degenerative changes as above. Multilevel foraminal stenosis, most pronounced and moderate right L3/L4, severe and right L4/L5, and moderate to severe at left L5/S1 levels. Please see report for level by level description. Nonspecific 1 cm T1 and T2 hypointense lesion in the L1 vertebral body. Suggest further characterization with contrast-enhanced MRI or CT. Likely bilateral renal cysts. At his last clinical appointment, I offered to try him on a different neuropathic medication, pt declined. Patient had wished to continue his current treatment. I provided him refills of Topamax 75 mg BID.       The patient returns today with left sided low back pain. He rates his pain 0-1/10, previously 1-3/10. Pt reports his pain is less intense in nature. He occasionally experiences radiculopathy into the LLE in a L5 distribution to the foot. He continues taking Topamax 75 mg BID. Pt denies change in bowel or bladder habits.  reviewed. Body mass index is 36.65 kg/m².     PCP: Liana Pedersen MD      Past Medical History:   Diagnosis Date    BPH (benign prostatic hyperplasia)     Elevated PSA     Hematuria     Hypertension     Kidney stones         Social History     Socioeconomic History    Marital status:      Spouse name: Not on file    Number of children: Not on file    Years of education: Not on file    Highest education level: Not on file   Occupational History    Not on file   Tobacco Use    Smoking status: Never Smoker    Smokeless tobacco: Current User     Types: Chew   Vaping Use    Vaping Use: Never used   Substance and Sexual Activity    Alcohol use: No    Drug use: No    Sexual activity: Not on file   Other Topics Concern     Service Not Asked    Blood Transfusions Not Asked    Caffeine Concern Not Asked    Occupational Exposure Not Asked    Hobby Hazards Not Asked    Sleep Concern Not Asked    Stress Concern Not Asked    Weight Concern Not Asked    Special Diet Not Asked    Back Care Not Asked    Exercise Not Asked    Bike Helmet Not Asked   [address] Not Asked    Self-Exams Not Asked   Social History Narrative    Not on file     Social Determinants of Health     Financial Resource Strain:     Difficulty of Paying Living Expenses:    Food Insecurity:     Worried About Running Out of Food in the Last Year:     [address] in the Last Year:    Transportation Needs:     Lack of Transportation (Medical):  Lack of Transportation (Non-Medical):    Physical Activity:     Days of Exercise per Week:     Minutes of Exercise per Session:    Stress:     Feeling of Stress :    Social Connections:     Frequency of Communication with Friends and Family:     Frequency of Social Gatherings with Friends and Family:     Attends Church Services:     Active Member of Clubs or Organizations:     Attends Club or Organization Meetings:     Marital Status:    Intimate Partner Violence:     Fear of Current or Ex-Partner:     Emotionally Abused:     Physically Abused:     Sexually Abused:        Current Outpatient Medications   Medication Sig Dispense Refill    topiramate (TOPAMAX) 25 mg tablet 3 tabs PO  Tablet 0    alfuzosin SR (UROXATRAL) 10 mg SR tablet TAKE ONE TABLET BY MOUTH DAILY AFTER DINNER 90 Tab 1    diclofenac EC (VOLTAREN) 75 mg EC tablet 75 mg two (2) times a day. 1    omeprazole (PRILOSEC) 40 mg capsule Take 40 mg by mouth daily.  valACYclovir (VALTREX) 500 mg tablet Take 500 mg by mouth daily.  sertraline (ZOLOFT) 100 mg tablet 100 mg two (2) times a day.  atorvastatin (LIPITOR) 10 mg tablet Take  by mouth daily.       lisinopril (PRINIVIL, ZESTRIL) 20 mg tablet Take  by mouth daily.      gabapentin (Neurontin) 800 mg tablet Take 1 Tab by mouth three (3) times daily (with meals). Max Daily Amount: 2,400 mg. Indications: neuropathic pain 90 Tab 1       No Known Allergies       PHYSICAL EXAMINATION    Visit Vitals  /87 (BP 1 Location: Left arm, BP Patient Position: Sitting)   Pulse 63   Temp 96.9 °F (36.1 °C) (Temporal)   Resp 18   Ht 5' 10\" (1.778 m)   Wt 255 lb 6.4 oz (115.8 kg)   SpO2 95%   BMI 36.65 kg/m²       CONSTITUTIONAL: NAD, A&O x 3  SENSATION: Intact to light touch throughout  RANGE OF MOTION: The patient has full passive range of motion in all four extremities. MOTOR:  Straight Leg Raise: Negative, bilateral               Hip Flex Knee Ext Knee Flex Ankle DF GTE Ankle PF Tone   Right +4/5 +4/5 +4/5 +4/5 +4/5 +4/5 +4/5   Left +4/5 +4/5 +4/5 +4/5 +4/5 +4/5 +4/5       ASSESSMENT   Diagnoses and all orders for this visit:    1. Lumbar radiculopathy  -     topiramate (TOPAMAX) 25 mg tablet; 3 tabs PO QHS    2. DDD (degenerative disc disease), lumbar  -     topiramate (TOPAMAX) 25 mg tablet; 3 tabs PO QHS    3. HNP (herniated nucleus pulposus), lumbar  -     topiramate (TOPAMAX) 25 mg tablet; 3 tabs PO QHS      IMPRESSION AND PLAN:  Patient returns to the office today with c/o left sided low back pain. Multiple treatment options were discussed. Patient wished to continue his current treatment. I provided him refills of Topamax 75 mg BID. Patient is neurologically intact. I will see the patient back in 6 month's time or earlier if needed. Written by Vladimir Bernal, as dictated by Jennifer Mayberry MD  I examined the patient, reviewed and agree with the note.

## 2021-09-13 ENCOUNTER — OFFICE VISIT (OUTPATIENT)
Dept: ORTHOPEDIC SURGERY | Age: 61
End: 2021-09-13
Payer: COMMERCIAL

## 2021-09-13 VITALS
HEART RATE: 63 BPM | BODY MASS INDEX: 36.56 KG/M2 | OXYGEN SATURATION: 95 % | RESPIRATION RATE: 18 BRPM | DIASTOLIC BLOOD PRESSURE: 87 MMHG | WEIGHT: 255.4 LBS | HEIGHT: 70 IN | TEMPERATURE: 96.9 F | SYSTOLIC BLOOD PRESSURE: 134 MMHG

## 2021-09-13 DIAGNOSIS — M54.16 LUMBAR RADICULOPATHY: Primary | ICD-10-CM

## 2021-09-13 DIAGNOSIS — M51.26 HNP (HERNIATED NUCLEUS PULPOSUS), LUMBAR: ICD-10-CM

## 2021-09-13 DIAGNOSIS — M51.36 DDD (DEGENERATIVE DISC DISEASE), LUMBAR: ICD-10-CM

## 2021-09-13 PROCEDURE — 99213 OFFICE O/P EST LOW 20 MIN: CPT | Performed by: PHYSICAL MEDICINE & REHABILITATION

## 2021-09-13 RX ORDER — TOPIRAMATE 25 MG/1
TABLET ORAL
Qty: 540 TABLET | Refills: 1 | Status: SHIPPED | OUTPATIENT
Start: 2021-09-13 | End: 2022-03-14

## 2021-09-14 NOTE — TELEPHONE ENCOUNTER
Patient is requesting a refill for Uroxatral.     Pt is being seen every 6 month(s),was last seen on March 3, 2021. Per last office note patient is to continue taking the above medication and has a follow-up appointment scheduled for 09/01/2021. Requested medication has been sent to the pharmacy on file. Not applicable

## 2021-10-13 RX ORDER — ALFUZOSIN HYDROCHLORIDE 10 MG/1
TABLET, EXTENDED RELEASE ORAL
Qty: 90 TABLET | Refills: 1 | OUTPATIENT
Start: 2021-10-13

## 2021-10-13 NOTE — TELEPHONE ENCOUNTER
Patient returned call to office, nurse was not available, please try to reach patient again at [phone].

## 2021-10-21 RX ORDER — ALFUZOSIN HYDROCHLORIDE 10 MG/1
TABLET, EXTENDED RELEASE ORAL
Qty: 90 TABLET | Refills: 1 | OUTPATIENT
Start: 2021-10-21

## 2021-10-21 NOTE — TELEPHONE ENCOUNTER
I contacted the pharmacy to let them know that we do not prescribe this medication for the patient. It appears that a mistake was made by a Brett Xochitl wright when last entering this medication. Chio Mirza is the normal prescriber.

## 2021-11-18 ENCOUNTER — PATIENT MESSAGE (OUTPATIENT)
Dept: ORTHOPEDIC SURGERY | Age: 61
End: 2021-11-18

## 2021-11-18 NOTE — TELEPHONE ENCOUNTER
Patient still has refills at the pharmacy for Topamax. I contacted the pharmacy to confirm and requested they refill it for the patient. No further action needed.

## 2022-03-11 NOTE — PROGRESS NOTES
[facility]  [address], [place]   Phone: [phone]  Fax: [phone]        PROGRESS NOTE      HISTORY OF PRESENT ILLNESS:  The patient is a 62 y.o. male and was seen today for follow up of eft sided low back pain. Previously seen for left-sided lower back pain radiating into the LLE in a L5 distribution to the foot involving digits 3-5. Previously, he was seen for progressive left foot paraesthesias. Previously, he was seen for low back pain with paraesthesias extending into the LLE to the great toe. He initially had low back pain radiating into the RLE medially from the groin to the ankle. His primary complaint at this time is paraesthesias. He denies specific injury or trauma. He denies hx of surgery or PT. Pt underwent a left-sided S1 SNRB and an L4/5 epidural on 12/18/18 with good relief. Pt treated with Prednisone in 9/2020 with benefit. Pt failed NEURONTIN 800 mg TID secondary to minimal benefit. Pt takes Diclofenac. Pt completed the MDP with temporary benefit. Pt denies fever, weight loss, or skin changes. Pt denies change in bowel or bladder habits. The patient is RHD. Note from Leticia Engel NP dated 11/14/18 indicating patient was seen with c/o low back pain into the left buttocks, extending into the LLE in an S1 distribution for around 11 days. No injury. He was treated with MDP by his PCP without much relief. He was started on Neurontin qhs and given an Rx for Norco and set up for an Etelvina, NP dated 9/22/2020 indicating patient was seen with c/o increase in left-sided low back pain radiating into the LLE to the foot x 3 days without trauma. Indicated his pain was 10/10. Indicated his baseline pain was 3/10. Started him on Prednisone. Preliminary reading of lumbar plain films revealed: mild disc space narrowing at L2/3 L4/5 and L5/S1. Small anterior osteophytes noted at L3 and L4. No acute pathology identified.  Lumbar spine MRI dated 11/23/18 reviewed. Per report, moderate-sized superiorly directed extrusion at the L4/L5 level with resultant moderate spinal canal narrowing, as described. Moderate size left paracentral extrusion at the L5/S1 level which impinges upon the traversing left S1 nerve root. Multilevel degenerative changes as above. Multilevel foraminal stenosis, most pronounced and moderate right L3/L4, severe and right L4/L5, and moderate to severe at left L5/S1 levels. Please see report for level by level description. Nonspecific 1 cm T1 and T2 hypointense lesion in the L1 vertebral body. Suggest further characterization with contrast-enhanced MRI or CT. Likely bilateral renal cysts. At his last clinical appointment, patient wished to continue his current treatment. I provided him refills of Topamax 75 mg BID.        The patient returns today and reports pain location and distribution remains unchanged. He rates his pain 0-1/10, unchanged. He is complaint with his Topamax 75 mg BID. He denies experiencing LLE radicular sxs. Denies previous PT. Pt denies change in bowel or bladder habits.  reviewed. Body mass index is 38.02 kg/m².     PCP: Pa Tariq MD      Past Medical History:   Diagnosis Date    BPH (benign prostatic hyperplasia)     Elevated PSA     Hematuria     Hypertension     Kidney stones         Social History     Socioeconomic History    Marital status:      Spouse name: Not on file    Number of children: Not on file    Years of education: Not on file    Highest education level: Not on file   Occupational History    Not on file   Tobacco Use    Smoking status: Never Smoker    Smokeless tobacco: Current User     Types: Chew   Vaping Use    Vaping Use: Never used   Substance and Sexual Activity    Alcohol use: No    Drug use: No    Sexual activity: Not on file   Other Topics Concern     Service Not Asked    Blood Transfusions Not Asked    Caffeine Concern Not Asked    Occupational Exposure Not Asked    Hobby Hazards Not Asked    Sleep Concern Not Asked    Stress Concern Not Asked    Weight Concern Not Asked    Special Diet Not Asked    Back Care Not Asked    Exercise Not Asked    Bike Helmet Not Asked   [address] Not Asked    Self-Exams Not Asked   Social History Narrative    Not on file     Social Determinants of Health     Financial Resource Strain:     Difficulty of Paying Living Expenses: Not on file   Food Insecurity:     Worried About Running Out of Food in the Last Year: Not on file    Yanique of Food in the Last Year: Not on file   Transportation Needs:     Lack of Transportation (Medical): Not on file    Lack of Transportation (Non-Medical): Not on file   Physical Activity:     Days of Exercise per Week: Not on file    Minutes of Exercise per Session: Not on file   Stress:     Feeling of Stress : Not on file   Social Connections:     Frequency of Communication with Friends and Family: Not on file    Frequency of Social Gatherings with Friends and Family: Not on file    Attends Rastafarian Services: Not on file    Active Member of [address] [place] [address] or Organizations: Not on file    Attends Club or Organization Meetings: Not on file    Marital Status: Not on file   Intimate Partner Violence:     Fear of Current or Ex-Partner: Not on file    Emotionally Abused: Not on file    Physically Abused: Not on file    Sexually Abused: Not on file   Housing Stability:     Unable to Pay for Housing in the Last Year: Not on file    Number of [place] in the Last Year: Not on file    Unstable Housing in the Last Year: Not on file       Current Outpatient Medications   Medication Sig Dispense Refill    alfuzosin SR (UROXATRAL) 10 mg SR tablet TAKE ONE TABLET BY MOUTH DAILY AFTER DINNER 90 Tablet 3    topiramate (TOPAMAX) 25 mg tablet 3 tabs PO  Tablet 0    diclofenac EC (VOLTAREN) 75 mg EC tablet 75 mg two (2) times a day.   1    omeprazole (PRILOSEC) 40 mg capsule Take 40 mg by mouth daily.  sertraline (ZOLOFT) 100 mg tablet 100 mg two (2) times a day.  atorvastatin (LIPITOR) 10 mg tablet Take  by mouth daily.  lisinopril (PRINIVIL, ZESTRIL) 20 mg tablet Take  by mouth daily.  tadalafiL (CIALIS) 20 mg tablet Take 1 Tablet by mouth as needed for Erectile Dysfunction. 30 Tablet 4       No Known Allergies       PHYSICAL EXAMINATION    Visit Vitals  Pulse 72   Temp 98.2 °F (36.8 °C)   Resp 17   Wt 265 lb (120.2 kg)   SpO2 98%   BMI 38.02 kg/m²       CONSTITUTIONAL: NAD, A&O x 3  SENSATION: Intact to light touch throughout  RANGE OF MOTION: The patient has full passive range of motion in all four extremities. MOTOR:  Straight Leg Raise: Negative, bilateral               Hip Flex Knee Ext Knee Flex Ankle DF GTE Ankle PF Tone   Right +4/5 +4/5 +4/5 +4/5 +4/5 +4/5 +4/5   Left +4/5 +4/5 +4/5 +4/5 +4/5 +4/5 +4/5       ASSESSMENT   Diagnoses and all orders for this visit:    1. Lumbar radiculopathy    2. DDD (degenerative disc disease), lumbar    3. Chronic left-sided low back pain with left-sided sciatica    4. HNP (herniated nucleus pulposus), lumbar      IMPRESSION AND PLAN:  Patient returns to the office today with c/o left sided back pain. Multiple treatment options were discussed. Pt continues to do well at this time with minimal back pain, noted LLE radicular sxs resolved. I will slowly wean him off Topamax 75 mg BID, 1 tablet/month as tolerable. Patient is neurologically intact. I will see the patient back in 6 month's time or earlier if needed. Written by Bob Buitrago, as dictated by Gale Young MD  I examined the patient, reviewed and agree with the note.

## 2022-03-14 ENCOUNTER — OFFICE VISIT (OUTPATIENT)
Dept: ORTHOPEDIC SURGERY | Age: 62
End: 2022-03-14
Payer: COMMERCIAL

## 2022-03-14 VITALS
HEART RATE: 72 BPM | RESPIRATION RATE: 17 BRPM | OXYGEN SATURATION: 98 % | BODY MASS INDEX: 38.02 KG/M2 | WEIGHT: 265 LBS | TEMPERATURE: 98.2 F

## 2022-03-14 DIAGNOSIS — M51.26 HNP (HERNIATED NUCLEUS PULPOSUS), LUMBAR: ICD-10-CM

## 2022-03-14 DIAGNOSIS — M54.42 CHRONIC LEFT-SIDED LOW BACK PAIN WITH LEFT-SIDED SCIATICA: ICD-10-CM

## 2022-03-14 DIAGNOSIS — G89.29 CHRONIC LEFT-SIDED LOW BACK PAIN WITH LEFT-SIDED SCIATICA: ICD-10-CM

## 2022-03-14 DIAGNOSIS — M54.16 LUMBAR RADICULOPATHY: Primary | ICD-10-CM

## 2022-03-14 DIAGNOSIS — M51.36 DDD (DEGENERATIVE DISC DISEASE), LUMBAR: ICD-10-CM

## 2022-03-14 PROCEDURE — 99213 OFFICE O/P EST LOW 20 MIN: CPT | Performed by: PHYSICAL MEDICINE & REHABILITATION

## 2022-03-14 RX ORDER — TOPIRAMATE 25 MG/1
TABLET ORAL
Qty: 540 TABLET | Refills: 1 | Status: SHIPPED | OUTPATIENT
Start: 2022-03-14 | End: 2022-09-11

## 2022-03-14 NOTE — LETTER
3/14/2022    Patient: Dillon Buck   YOB: 1960   Date of Visit: 3/14/2022     Lillian Salmeron MD  [address] [address]  Via Fax: [phone]    Dear Lillian Salmeron MD,      Thank you for referring Mr. Mariel Mayfield to [facility] for evaluation. My notes for this consultation are attached. If you have questions, please do not hesitate to call me. I look forward to following your patient along with you.       Sincerely,    Rene Godoy MD

## 2022-03-18 PROBLEM — E66.811 OBESITY (BMI 30.0-34.9): Status: ACTIVE | Noted: 2018-04-05

## 2022-03-18 PROBLEM — E66.9 OBESITY (BMI 30.0-34.9): Status: ACTIVE | Noted: 2018-04-05

## 2022-03-19 PROBLEM — M51.369 DDD (DEGENERATIVE DISC DISEASE), LUMBAR: Status: ACTIVE | Noted: 2018-08-08

## 2022-03-19 PROBLEM — Z87.442 HISTORY OF NEPHROLITHIASIS: Status: ACTIVE | Noted: 2018-04-05

## 2022-03-19 PROBLEM — N40.1 BPH WITH OBSTRUCTION/LOWER URINARY TRACT SYMPTOMS: Status: ACTIVE | Noted: 2018-04-05

## 2022-03-19 PROBLEM — M51.36 DDD (DEGENERATIVE DISC DISEASE), LUMBAR: Status: ACTIVE | Noted: 2018-08-08

## 2022-03-19 PROBLEM — N13.8 BPH WITH OBSTRUCTION/LOWER URINARY TRACT SYMPTOMS: Status: ACTIVE | Noted: 2018-04-05

## 2022-03-19 PROBLEM — M47.817 LUMBOSACRAL SPONDYLOSIS WITHOUT MYELOPATHY: Status: ACTIVE | Noted: 2018-08-08

## 2022-03-19 PROBLEM — M54.16 LUMBAR NEURITIS: Status: ACTIVE | Noted: 2018-08-08

## 2022-03-19 PROBLEM — E66.01 SEVERE OBESITY (HCC): Status: ACTIVE | Noted: 2018-12-03

## 2022-03-19 PROBLEM — Z87.448 HISTORY OF HEMATURIA: Status: ACTIVE | Noted: 2018-04-05

## 2022-04-05 NOTE — TELEPHONE ENCOUNTER
Chief Complaint   Patient presents with    Abdominal Pain     lower abdominal pain x 2 months         Visit Vitals  /74 (BP 1 Location: Right upper arm, BP Patient Position: Sitting)   Pulse 70   Temp 97.1 °F (36.2 °C) (Temporal)   Resp 20   SpO2 98%        1. Have you been to the ER, urgent care clinic since your last visit? Hospitalized since your last visit? Yes March 11, 2022  Surgery heel spur    2. Have you seen or consulted any other health care providers outside of the [address] [place] [address] since your last visit? Include any pap smears or colon screening.  No Edith from [address] [place] [address] called for Mariza Farrell. Trey Carolin said that they believe they have lost the Gabapentin Medication that was prescribed to the patient on 8/7/19. That the patient told them he had dropped off the script. Edith is requesting a new order. Edith from [address] [place] [address] on [place]. Tel. [phone]. Fax [phone].

## 2022-09-10 DIAGNOSIS — M54.16 LUMBAR RADICULOPATHY: ICD-10-CM

## 2022-09-10 DIAGNOSIS — M51.26 HNP (HERNIATED NUCLEUS PULPOSUS), LUMBAR: ICD-10-CM

## 2022-09-10 DIAGNOSIS — M51.36 DDD (DEGENERATIVE DISC DISEASE), LUMBAR: ICD-10-CM

## 2022-09-11 RX ORDER — TOPIRAMATE 25 MG/1
TABLET ORAL
Qty: 540 TABLET | Refills: 1 | Status: SHIPPED | OUTPATIENT
Start: 2022-09-11

## 2022-09-12 ENCOUNTER — OFFICE VISIT (OUTPATIENT)
Dept: ORTHOPEDIC SURGERY | Age: 62
End: 2022-09-12
Payer: COMMERCIAL

## 2022-09-12 VITALS
TEMPERATURE: 97.5 F | HEART RATE: 67 BPM | RESPIRATION RATE: 18 BRPM | OXYGEN SATURATION: 99 % | BODY MASS INDEX: 37.59 KG/M2 | WEIGHT: 262 LBS

## 2022-09-12 DIAGNOSIS — M54.16 LUMBAR RADICULOPATHY: ICD-10-CM

## 2022-09-12 DIAGNOSIS — M51.26 HNP (HERNIATED NUCLEUS PULPOSUS), LUMBAR: ICD-10-CM

## 2022-09-12 DIAGNOSIS — M51.36 DDD (DEGENERATIVE DISC DISEASE), LUMBAR: ICD-10-CM

## 2022-09-12 DIAGNOSIS — M54.50 LUMBAR PAIN: Primary | ICD-10-CM

## 2022-09-12 PROCEDURE — 99213 OFFICE O/P EST LOW 20 MIN: CPT | Performed by: PHYSICAL MEDICINE & REHABILITATION

## 2022-09-12 NOTE — PROGRESS NOTES
[facility]  [address], [place]   Phone: [phone]  Fax: [phone]        PROGRESS NOTE      HISTORY OF PRESENT ILLNESS:  The patient is a 65 y.o. male and was seen today for follow up of left sided low back pain. Previously seen for left-sided lower back pain radiating into the LLE in a L5 distribution to the foot involving digits 3-5. Previously, he was seen for progressive left foot paraesthesias. Previously, he was seen for low back pain with paraesthesias extending into the LLE to the great toe. He initially had low back pain radiating into the RLE medially from the groin to the ankle. His primary complaint at this time is paraesthesias. He denies specific injury or trauma. He denies hx of surgery or PT. Pt underwent a left-sided S1 SNRB and an L4/5 epidural on 12/18/18 with good relief. Pt treated with Prednisone in 9/2020 with benefit. Pt failed NEURONTIN 800 mg TID secondary to minimal benefit. Pt takes Diclofenac. Pt completed the MDP with temporary benefit. Pt denies fever, weight loss, or skin changes. Pt denies change in bowel or bladder habits. The patient is RHD. Note from Jen Vinson NP dated 11/14/18 indicating patient was seen with c/o low back pain into the left buttocks, extending into the LLE in an S1 distribution for around 11 days. No injury. He was treated with MDP by his PCP without much relief. He was started on Neurontin qhs and given an Rx for Norco and set up for an MRI. Note from Jen Vinson NP dated 9/22/2020 indicating patient was seen with c/o increase in left-sided low back pain radiating into the LLE to the foot x 3 days without trauma. Indicated his pain was 10/10. Indicated his baseline pain was 3/10. Started him on Prednisone. Preliminary reading of lumbar plain films revealed: mild disc space narrowing at L2/3 L4/5 and L5/S1. Small anterior osteophytes noted at L3 and L4. No acute pathology identified.  Lumbar spine MRI dated 11/23/18 reviewed. Per report, moderate-sized superiorly directed extrusion at the L4/L5 level with resultant moderate spinal canal narrowing, as described. Moderate size left paracentral extrusion at the L5/S1 level which impinges upon the traversing left S1 nerve root. Multilevel degenerative changes as above. Multilevel foraminal stenosis, most pronounced and moderate right L3/L4, severe and right L4/L5, and moderate to severe at left L5/S1 levels. Please see report for level by level description. Nonspecific 1 cm T1 and T2 hypointense lesion in the L1 vertebral body. Suggest further characterization with contrast-enhanced MRI or CT. Likely bilateral renal cysts. At his last clinical appointment, pt continued to do well at the time with minimal back pain, noted LLE radicular sxs resolved. I slowly weaned him off Topamax 75 mg BID, 1 tablet/month as tolerable. The patient returns today and reports pain location and distribution remains unchanged. He rates his pain 1-2/10, previously 0-1/10. The pt admits he had a major flare up when he had been weaning off of the medication. He was able to cut back to 50 mg qhs without increase in pain but had tried cutting back to 25 mg qhs with an increase in pain. He states he has been back to taking Topamax 75 mg qhs for the past 4 months, pain has been holding steady since getting back on 75 mg qhs. He is compliant with his HEP. Pt denies change in bowel or bladder habits.  reviewed. Body mass index is 37.59 kg/m².     PCP: Ninfa Donaldson MD      Past Medical History:   Diagnosis Date    BPH (benign prostatic hyperplasia)     Elevated PSA     Hematuria     Hypertension     Kidney stones         Social History     Socioeconomic History    Marital status:      Spouse name: Not on file    Number of children: Not on file    Years of education: Not on file    Highest education level: Not on file   Occupational History    Not on file   Tobacco Use    Smoking status: Never    Smokeless tobacco: Current     Types: Chew   Vaping Use    Vaping Use: Never used   Substance and Sexual Activity    Alcohol use: No    Drug use: No    Sexual activity: Not on file   Other Topics Concern     Service Not Asked    Blood Transfusions Not Asked    Caffeine Concern Not Asked    Occupational Exposure Not Asked    Hobby Hazards Not Asked    Sleep Concern Not Asked    Stress Concern Not Asked    Weight Concern Not Asked    Special Diet Not Asked    Back Care Not Asked    Exercise Not Asked    Bike Helmet Not Asked    Seat Belt Not Asked    Self-Exams Not Asked   Social History Narrative    Not on file     Social Determinants of Health     Financial Resource Strain: Not on file   Food Insecurity: Not on file   Transportation Needs: Not on file   Physical Activity: Not on file   Stress: Not on file   Social Connections: Not on file   Intimate Partner Violence: Not on file   Housing Stability: Not on file       Current Outpatient Medications   Medication Sig Dispense Refill    topiramate (TOPAMAX) 25 mg tablet TAKE THREE TABLETS BY MOUTH TWICE A  Tablet 1    alfuzosin SR (UROXATRAL) 10 mg SR tablet TAKE ONE TABLET BY MOUTH DAILY AFTER DINNER 90 Tablet 3    diclofenac EC (VOLTAREN) 75 mg EC tablet 75 mg two (2) times a day. 1    omeprazole (PRILOSEC) 40 mg capsule Take 40 mg by mouth daily. sertraline (ZOLOFT) 100 mg tablet 100 mg two (2) times a day. atorvastatin (LIPITOR) 10 mg tablet Take  by mouth daily. lisinopril (PRINIVIL, ZESTRIL) 20 mg tablet Take  by mouth daily. tadalafiL (CIALIS) 20 mg tablet Take 1 Tablet by mouth as needed for Erectile Dysfunction.  30 Tablet 4       No Known Allergies       REVIEW OF SYSTEMS  Constitutional symptoms: Negative  Eyes: Negative  Ears, Nose, Throat, and Mouth: Negative  Cardiovascular: Negative  Respiratory: Negative  Genitourinary: Negative  Integumentary (Skin and/or breast): Negative  Musculoskeletal: Positive for left sided low back pain  Extremities: Negative for edema. Endocrine/Rheumatologic: Negative  Hematologic/Lymphatic: Negative  Allergic/Immunologic: Negative  Psychiatric: Negative     PHYSICAL EXAMINATION    Visit Vitals  Pulse 67   Temp 97.5 °F (36.4 °C)   Resp 18   Wt 262 lb (118.8 kg)   SpO2 99%   BMI 37.59 kg/m²       CONSTITUTIONAL: NAD, A&O x 3  SENSATION: Intact to light touch throughout  RANGE OF MOTION: The patient has full passive range of motion in all four extremities. MOTOR:  Straight Leg Raise: Negative, bilateral               Hip Flex Knee Ext Knee Flex Ankle DF GTE Ankle PF Tone   Right +4/5 +4/5 +4/5 +4/5 +4/5 +4/5 +4/5   Left +4/5 +4/5 +4/5 +4/5 +4/5 +4/5 +4/5       ASSESSMENT   Diagnoses and all orders for this visit:    1. Lumbar pain    2. Lumbar radiculopathy    3. DDD (degenerative disc disease), lumbar    4. HNP (herniated nucleus pulposus), lumbar      IMPRESSION AND PLAN:  Patient returns to the office today with c/o left sided low back pain. Multiple treatment options were discussed. The pt failed weaning himself off the Topamax and wished to continue his current treatment. He did not require refills of Topamax 75 mg qhs. I encouraged him to continue to perform his daily HEP. Patient is neurologically intact. I will see the patient back in 6 month's time or earlier if needed. Written by Yuki Bennett, as dictated by Marivel Mead MD  I examined the patient, reviewed and agree with the note.

## 2022-11-10 DIAGNOSIS — M51.26 HNP (HERNIATED NUCLEUS PULPOSUS), LUMBAR: ICD-10-CM

## 2022-11-10 DIAGNOSIS — M54.16 LUMBAR RADICULOPATHY: ICD-10-CM

## 2022-11-10 DIAGNOSIS — M51.36 DDD (DEGENERATIVE DISC DISEASE), LUMBAR: ICD-10-CM

## 2022-11-10 RX ORDER — TOPIRAMATE 25 MG/1
TABLET ORAL
Qty: 540 TABLET | Refills: 1 | OUTPATIENT
Start: 2022-11-10

## 2023-01-11 DIAGNOSIS — M51.36 DDD (DEGENERATIVE DISC DISEASE), LUMBAR: ICD-10-CM

## 2023-01-11 DIAGNOSIS — M54.16 LUMBAR RADICULOPATHY: ICD-10-CM

## 2023-01-11 DIAGNOSIS — M51.26 HNP (HERNIATED NUCLEUS PULPOSUS), LUMBAR: ICD-10-CM

## 2023-01-11 RX ORDER — TOPIRAMATE 25 MG/1
TABLET ORAL
Qty: 540 TABLET | Refills: 1 | OUTPATIENT
Start: 2023-01-11

## 2023-03-13 ENCOUNTER — OFFICE VISIT (OUTPATIENT)
Age: 63
End: 2023-03-13
Payer: COMMERCIAL

## 2023-03-13 VITALS
OXYGEN SATURATION: 96 % | BODY MASS INDEX: 33.72 KG/M2 | RESPIRATION RATE: 17 BRPM | WEIGHT: 235 LBS | HEART RATE: 67 BPM | TEMPERATURE: 98 F

## 2023-03-13 DIAGNOSIS — M54.16 LUMBAR NEURITIS: Primary | ICD-10-CM

## 2023-03-13 DIAGNOSIS — M51.26 HNP (HERNIATED NUCLEUS PULPOSUS), LUMBAR: ICD-10-CM

## 2023-03-13 DIAGNOSIS — M51.36 DDD (DEGENERATIVE DISC DISEASE), LUMBAR: ICD-10-CM

## 2023-03-13 PROCEDURE — 99213 OFFICE O/P EST LOW 20 MIN: CPT | Performed by: PHYSICAL MEDICINE & REHABILITATION

## 2023-03-13 NOTE — PROGRESS NOTES
[facility]  [address], [place]   Phone: [phone]  Fax: [phone]        PROGRESS NOTE      HISTORY OF PRESENT ILLNESS:  The patient is a 65 y.o. male and was seen today for follow up of low back pain radiating to the LLE in a S1 distribution to the calf. Previously seen for left sided low back pain. Previously seen for left-sided lower back pain radiating into the LLE in a L5 distribution to the foot involving  digits 3-5. Previously, he was seen for  progressive left foot paraesthesias. Previously, he was seen for low back pain with paraesthesias extending into the LLE to the great toe. He initially had low back pain radiating  into the RLE medially from the groin to the ankle. His primary complaint at this time is paraesthesias. He denies specific injury or trauma. He denies hx of surgery or PT. Pt underwent a left-sided S1 SNRB and an L4/5 epidural  on 12/18/18 with good relief. Pt treated with Prednisone in 9/2020 with benefit. Pt failed NEURONTIN 800 mg TID secondary to minimal benefit. Pt takes Diclofenac. Pt completed the MDP with temporary benefit. Pt denies fever, weight loss, or skin changes. Pt denies change in bowel or bladder habits. The patient is RHD. Note from  Veronica Pittman NP dated 11/14/18  indicating patient was seen with c/o low back pain into the left buttocks, extending into the LLE in an S1 distribution for around 11 days. No injury. He was treated with MDP by his PCP without much relief. He was started on Neurontin qhs and given an  Rx for Norco and set up for an MRI. Note from  Veronica Pittman NP dated 9/22/2020  indicating patient was seen with c/o increase in left-sided low back pain  radiating into the LLE to the foot x 3 days without trauma. Indicated his pain was 10/10. Indicated his baseline pain was 3/10. Started him on Prednisone.   Preliminary reading of lumbar plain films revealed: mild disc space narrowing at  L2/3 L4/5 and L5/S1. Small anterior osteophytes noted at L3 and L4. No acute pathology identified. Lumbar spine MRI  dated 11/23/18 reviewed. Per  report, moderate-sized superiorly directed extrusion at the L4/L5 level with resultant moderate spinal canal narrowing, as described. Moderate size left paracentral extrusion at the L5/S1 level which impinges upon the traversing left S1 nerve root. Multilevel degenerative changes as above. Multilevel foraminal stenosis, most pronounced and moderate right L3/L4, severe and right L4/L5, and moderate to severe at left L5/S1 levels. Please see report  for level by level description. Nonspecific 1 cm T1 and T2 hypointense lesion in the L1 vertebral body. Suggest further characterization with contrast-enhanced MRI or CT. Likely bilateral renal cysts. At his last clinical appointment, The pt failed weaning himself off the Topamax and wished to continue his current treatment. He did not require refills of Topamax 75 mg qhs. I encouraged him to continue to perform his daily HEP. The patient returns today with low back pain radiating to the LLE in a S1 distribution to the calf. He rates his pain 1/10, previously 1-2/10. Patient says his pain is doing well right now. Patient still takes his Topamax 75 mg QHS. Pt denies change in bowel or bladder habits. He is compliant with his HEP.  reviewed. Body mass index is 33.72 kg/m².     PCP: Nita Lantigua MD      Past Medical History:   Diagnosis Date    BPH (benign prostatic hyperplasia)     Elevated PSA     Hematuria     Hypertension     Kidney stones        Social History     Socioeconomic History    Marital status:      Spouse name: None    Number of children: None    Years of education: None    Highest education level: None   Tobacco Use    Smoking status: Never    Smokeless tobacco: Current   Substance and Sexual Activity    Alcohol use: No    Drug use: No       Current Outpatient Medications   Medication Sig Dispense Refill    tadalafil (CIALIS) 20 MG tablet TAKE ONE TABLET BY MOUTH DAILY AS NEEDED FOR ERECTILE DYSFUNCTION 30 tablet 3    alfuzosin (UROXATRAL) 10 MG extended release tablet Take one tablet by mouth daily after dinner 30 tablet 3    atorvastatin (LIPITOR) 10 MG tablet Take by mouth daily      diclofenac (VOLTAREN) 75 MG EC tablet 75 mg 2 times daily      lisinopril (PRINIVIL;ZESTRIL) 20 MG tablet Take by mouth daily      omeprazole (PRILOSEC) 40 MG delayed release capsule Take 40 mg by mouth daily      sertraline (ZOLOFT) 100 MG tablet 100 mg 2 times daily      topiramate (TOPAMAX) 25 MG tablet TAKE THREE TABLETS BY MOUTH TWICE A DAY       No current facility-administered medications for this visit. No Known Allergies       PHYSICAL EXAMINATION    Pulse 67   Temp 98 °F (36.7 °C)   Resp 17   Wt 235 lb (106.6 kg)   SpO2 96%   BMI 33.72 kg/m²     CONSTITUTIONAL: NAD, A&O x 3  SENSATION: Sensation is intact to light touch throughout. MOTOR:  Straight Leg Raise: Negative, bilateral    Ambulates without an assistive device     Hip Flex Knee Ext Knee Flex Ankle DF GTE Ankle PF Tone   Right +4/5 +4/5 +4/5 +4/5 +4/5 +4/5 +4/5   Left +4/5 +4/5 +4/5 +4/5 +4/5 +4/5 +4/5       ASSESSMENT   Oscar Ta was seen today for back problem. Diagnoses and all orders for this visit:    Lumbar neuritis    DDD (degenerative disc disease), lumbar    HNP (herniated nucleus pulposus), lumbar        IMPRESSION AND PLAN:  Patient returns to the office today with c/o low back pain radiating to the LLE in a S1 distribution to the calf. Multiple treatment options were discussed. Patient does not need a refill of his Topamax 75 mg QHS. I recommended the patient continue to perform his HEP everyday. Patient is neurologically intact. I will see the patient back in 6 month's time or earlier if needed.     Written by Frederic Tavera, as dictated by Yue Rodrigues MD  I examined the patient, reviewed and agree with the note.

## 2023-09-11 ENCOUNTER — OFFICE VISIT (OUTPATIENT)
Age: 63
End: 2023-09-11
Payer: COMMERCIAL

## 2023-09-11 VITALS
WEIGHT: 221 LBS | TEMPERATURE: 97.8 F | BODY MASS INDEX: 29.97 KG/M2 | HEART RATE: 65 BPM | OXYGEN SATURATION: 96 % | RESPIRATION RATE: 17 BRPM

## 2023-09-11 DIAGNOSIS — M51.36 DDD (DEGENERATIVE DISC DISEASE), LUMBAR: ICD-10-CM

## 2023-09-11 DIAGNOSIS — M54.16 LUMBAR NEURITIS: Primary | ICD-10-CM

## 2023-09-11 DIAGNOSIS — M51.26 HNP (HERNIATED NUCLEUS PULPOSUS), LUMBAR: ICD-10-CM

## 2023-09-11 PROCEDURE — 99214 OFFICE O/P EST MOD 30 MIN: CPT | Performed by: PHYSICAL MEDICINE & REHABILITATION

## 2023-09-11 RX ORDER — TOPIRAMATE 25 MG/1
75 TABLET ORAL 2 TIMES DAILY
Qty: 540 TABLET | Refills: 1 | Status: SHIPPED | OUTPATIENT
Start: 2023-09-11

## 2023-10-04 NOTE — LETTER
9/13/2021    Patient: Sadia Donahue   YOB: 1960   Date of Visit: 9/13/2021     Landon Bey MD  [address] [address]  Via Fax: [phone]    Dear Landon Bey MD,      Thank you for referring Mr. Mana Vergara to [facility] for evaluation. My notes for this consultation are attached. If you have questions, please do not hesitate to call me. I look forward to following your patient along with you.       Sincerely,    Shantelle Beal MD epidural placement

## 2024-03-10 DIAGNOSIS — M54.16 LUMBAR NEURITIS: ICD-10-CM

## 2024-03-10 DIAGNOSIS — M51.36 DDD (DEGENERATIVE DISC DISEASE), LUMBAR: ICD-10-CM

## 2024-03-10 DIAGNOSIS — M51.26 HNP (HERNIATED NUCLEUS PULPOSUS), LUMBAR: ICD-10-CM

## 2024-03-10 RX ORDER — TOPIRAMATE 25 MG/1
TABLET ORAL
Qty: 540 TABLET | Refills: 1 | OUTPATIENT
Start: 2024-03-10

## 2024-03-20 ENCOUNTER — OFFICE VISIT (OUTPATIENT)
Age: 64
End: 2024-03-20
Payer: COMMERCIAL

## 2024-03-20 VITALS
SYSTOLIC BLOOD PRESSURE: 124 MMHG | HEIGHT: 72 IN | TEMPERATURE: 97.6 F | WEIGHT: 229 LBS | HEART RATE: 62 BPM | BODY MASS INDEX: 31.02 KG/M2 | DIASTOLIC BLOOD PRESSURE: 79 MMHG | OXYGEN SATURATION: 96 %

## 2024-03-20 DIAGNOSIS — M51.36 DDD (DEGENERATIVE DISC DISEASE), LUMBAR: ICD-10-CM

## 2024-03-20 DIAGNOSIS — M54.16 LUMBAR NEURITIS: Primary | ICD-10-CM

## 2024-03-20 DIAGNOSIS — M51.26 HNP (HERNIATED NUCLEUS PULPOSUS), LUMBAR: ICD-10-CM

## 2024-03-20 PROCEDURE — 3078F DIAST BP <80 MM HG: CPT | Performed by: PHYSICAL MEDICINE & REHABILITATION

## 2024-03-20 PROCEDURE — 3074F SYST BP LT 130 MM HG: CPT | Performed by: PHYSICAL MEDICINE & REHABILITATION

## 2024-03-20 PROCEDURE — 99214 OFFICE O/P EST MOD 30 MIN: CPT | Performed by: PHYSICAL MEDICINE & REHABILITATION

## 2024-03-20 RX ORDER — TOPIRAMATE 25 MG/1
75 TABLET ORAL 2 TIMES DAILY
Qty: 540 TABLET | Refills: 1 | Status: SHIPPED | OUTPATIENT
Start: 2024-03-20

## 2024-03-20 RX ORDER — METHYLPREDNISOLONE 4 MG/1
TABLET ORAL
Qty: 1 KIT | Refills: 0 | Status: SHIPPED | OUTPATIENT
Start: 2024-03-20

## 2024-05-06 ENCOUNTER — OFFICE VISIT (OUTPATIENT)
Age: 64
End: 2024-05-06
Payer: COMMERCIAL

## 2024-05-06 VITALS
TEMPERATURE: 98 F | HEIGHT: 72 IN | OXYGEN SATURATION: 97 % | BODY MASS INDEX: 31.15 KG/M2 | WEIGHT: 230 LBS | HEART RATE: 62 BPM

## 2024-05-06 DIAGNOSIS — M48.061 SPINAL STENOSIS OF LUMBAR REGION, UNSPECIFIED WHETHER NEUROGENIC CLAUDICATION PRESENT: ICD-10-CM

## 2024-05-06 DIAGNOSIS — M51.36 DDD (DEGENERATIVE DISC DISEASE), LUMBAR: ICD-10-CM

## 2024-05-06 DIAGNOSIS — M54.16 LUMBAR NEURITIS: Primary | ICD-10-CM

## 2024-05-06 DIAGNOSIS — M51.26 HNP (HERNIATED NUCLEUS PULPOSUS), LUMBAR: ICD-10-CM

## 2024-05-06 PROCEDURE — 99214 OFFICE O/P EST MOD 30 MIN: CPT | Performed by: PHYSICAL MEDICINE & REHABILITATION

## 2024-05-06 NOTE — PROGRESS NOTES
[facility]  [address]  [place] [address]  Tel: [phone]  Fax: [phone]          PROGRESS NOTE      HISTORY OF PRESENT ILLNESS:  The patient is a 63 y.o. male and was seen today for follow up of  low back pain radiating to the LLE in a S1 distribution to the calf. Previously seen for left sided low back pain. Previously seen for left-sided lower back pain radiating into the LLE in a L5 distribution to the foot involving  digits 3-5. Previously, he was seen for  progressive left foot paraesthesias. Previously, he was seen for low back pain with paraesthesias extending into the LLE to the great toe. He initially had low back pain radiating  into the RLE medially from the groin to the ankle. His primary complaint at this time is paraesthesias. He denies specific injury or trauma.  He denies hx of surgery or PT. Pt underwent a left-sided S1 SNRB and an L4/5 epidural  on 12/18/18 with good relief. Patient reports that to his knowledge his kidneys function properly, GFR over 60 on 4/11/2024. Pt treated with Prednisone in 9/2020 with benefit. Pt failed NEURONTIN 800 mg TID secondary to minimal benefit. Pt takes Diclofenac.  Pt completed the MDP with temporary benefit. Pt denies fever, weight loss, or skin changes.  Pt denies change in bowel or bladder habits. The patient is RHD. Note from  Lorrie Forde NP dated 11/14/18  indicating patient was seen with c/o low back pain into the left buttocks, extending into the LLE in an S1 distribution for around 11 days. No injury. He was treated with MDP by his PCP without much relief. He was started on Neurontin qhs and given an  Rx for Norco and set up for an MRI. Note from  Lorrie Forde NP dated 9/22/2020  indicating patient was seen with c/o increase in left-sided low back pain  radiating into the LLE to the foot x 3 days without trauma. Indicated his pain was 10/10. Indicated his baseline pain was 3/10. Started

## 2024-05-07 RX ORDER — DULOXETIN HYDROCHLORIDE 30 MG/1
30 CAPSULE, DELAYED RELEASE ORAL DAILY
Qty: 30 CAPSULE | Refills: 2 | Status: SHIPPED | OUTPATIENT
Start: 2024-05-07

## 2024-05-07 NOTE — TELEPHONE ENCOUNTER
Dr Asencio would like to discontinue this patients zoloft and start him on cymbalta 30mg, but needs the ok from Dr Rodriguez. A letter was typed and faxed to Dr Spicer office at [phone]

## 2024-05-07 NOTE — TELEPHONE ENCOUNTER
Dr Rodriguez sent the letter back stating that it is ok to change his medication. RX pended for signature. Patient is aware that he will be stopping the zoloft and starting the cymbalta.

## 2024-05-08 DIAGNOSIS — M48.061 SPINAL STENOSIS OF LUMBAR REGION, UNSPECIFIED WHETHER NEUROGENIC CLAUDICATION PRESENT: ICD-10-CM

## 2024-06-05 NOTE — LETTER
9/12/2022    Patient: Fern Batista   YOB: 1960   Date of Visit: 9/12/2022     Carla Russell MD  [address] [address]  Via Fax: [phone]    Dear Carla Russell MD,      Thank you for referring Mr. Denice Kinney to [address] for evaluation. My notes for this consultation are attached. If you have questions, please do not hesitate to call me. I look forward to following your patient along with you.       Sincerely,    Nia Hoover MD
home

## 2024-07-16 NOTE — PROGRESS NOTES
[facility]  [address] [facility] [address]  [place] [address]  Tel: [phone]  Fax: [phone]          PROGRESS NOTE      HISTORY OF PRESENT ILLNESS:  The patient is a 63 y.o. male and was seen today for follow up of low back pain radiating to the LLE in a S1 distribution to the calf. Previously seen for left sided low back pain. Previously seen for left-sided lower back pain radiating into the LLE in a L5 distribution to the foot involving  digits 3-5. Previously, he was seen for  progressive left foot paraesthesias. Previously, he was seen for low back pain with paraesthesias extending into the LLE to the great toe. He initially had low back pain radiating  into the RLE medially from the groin to the ankle. His primary complaint at this time is paraesthesias. He denies specific injury or trauma.  He denies hx of surgery or PT. Pt underwent a left-sided S1 SNRB and an L4/5 epidural  on 12/18/18 with good relief. Patient reports that to his knowledge his kidneys function properly, GFR over 60 on 4/11/2024. Pt treated with Prednisone in 9/2020 with benefit. Pt failed NEURONTIN 800 mg TID secondary to minimal benefit. Pt takes Diclofenac.  Pt completed the MDP with temporary benefit. Pt denies fever, weight loss, or skin changes.  Pt denies change in bowel or bladder habits. The patient is RHD. Note from  Lorrie Forde NP dated 11/14/18  indicating patient was seen with c/o low back pain into the left buttocks, extending into the LLE in an S1 distribution for around 11 days. No injury. He was treated with MDP by his PCP without much relief. He was started on Neurontin qhs and given an  Rx for Norco and set up for an MRI. Note from  Lorrie Forde NP dated 9/22/2020  indicating patient was seen with c/o increase in left-sided low back pain  radiating into the LLE to the foot x 3 days without trauma. Indicated his pain was 10/10. Indicated his baseline pain was 3/10. Started

## 2024-07-17 ENCOUNTER — OFFICE VISIT (OUTPATIENT)
Age: 64
End: 2024-07-17
Payer: COMMERCIAL

## 2024-07-17 VITALS
HEART RATE: 59 BPM | HEIGHT: 72 IN | WEIGHT: 231 LBS | TEMPERATURE: 98 F | OXYGEN SATURATION: 99 % | BODY MASS INDEX: 31.29 KG/M2

## 2024-07-17 DIAGNOSIS — M51.36 DDD (DEGENERATIVE DISC DISEASE), LUMBAR: ICD-10-CM

## 2024-07-17 DIAGNOSIS — M51.26 HNP (HERNIATED NUCLEUS PULPOSUS), LUMBAR: ICD-10-CM

## 2024-07-17 DIAGNOSIS — M54.16 LUMBAR NEURITIS: Primary | ICD-10-CM

## 2024-07-17 DIAGNOSIS — M48.061 SPINAL STENOSIS OF LUMBAR REGION, UNSPECIFIED WHETHER NEUROGENIC CLAUDICATION PRESENT: ICD-10-CM

## 2024-07-17 PROCEDURE — 99214 OFFICE O/P EST MOD 30 MIN: CPT | Performed by: PHYSICAL MEDICINE & REHABILITATION

## 2024-07-17 RX ORDER — DULOXETIN HYDROCHLORIDE 60 MG/1
60 CAPSULE, DELAYED RELEASE ORAL
Qty: 30 CAPSULE | Refills: 2 | Status: SHIPPED | OUTPATIENT
Start: 2024-07-17

## 2024-07-17 RX ORDER — TOPIRAMATE 25 MG/1
75 TABLET ORAL 2 TIMES DAILY
Qty: 540 TABLET | Refills: 0 | Status: SHIPPED | OUTPATIENT
Start: 2024-07-17

## 2024-10-14 RX ORDER — DULOXETIN HYDROCHLORIDE 60 MG/1
60 CAPSULE, DELAYED RELEASE ORAL NIGHTLY
Qty: 30 CAPSULE | Refills: 2 | OUTPATIENT
Start: 2024-10-14

## 2024-10-14 NOTE — TELEPHONE ENCOUNTER
Last seen 7-17-24  Provider cancelled last appt 9-18-24    No future appt scheduled.    Last rx 7-17-24 #30 with 2 RF

## 2024-10-28 RX ORDER — DULOXETIN HYDROCHLORIDE 60 MG/1
60 CAPSULE, DELAYED RELEASE ORAL NIGHTLY
Qty: 30 CAPSULE | Refills: 0 | Status: SHIPPED | OUTPATIENT
Start: 2024-10-28

## 2024-12-02 RX ORDER — DULOXETIN HYDROCHLORIDE 60 MG/1
60 CAPSULE, DELAYED RELEASE ORAL NIGHTLY
Qty: 90 CAPSULE | Refills: 0 | Status: SHIPPED | OUTPATIENT
Start: 2024-12-02

## 2024-12-02 NOTE — TELEPHONE ENCOUNTER
Per Dr Asencio note from July where he increased the Cymbalta:   I will see the patient back in 2 months or earlier if needed.  He did not make a fu appt

## 2024-12-02 NOTE — TELEPHONE ENCOUNTER
Patient did have a follow up in September and our office cancelled it and rescheduled him to December 18.

## 2024-12-18 ENCOUNTER — OFFICE VISIT (OUTPATIENT)
Age: 64
End: 2024-12-18
Payer: COMMERCIAL

## 2024-12-18 VITALS — HEIGHT: 72 IN | TEMPERATURE: 97 F | WEIGHT: 242 LBS | BODY MASS INDEX: 32.78 KG/M2

## 2024-12-18 DIAGNOSIS — M51.362 DEGENERATION OF INTERVERTEBRAL DISC OF LUMBAR REGION WITH DISCOGENIC BACK PAIN AND LOWER EXTREMITY PAIN: ICD-10-CM

## 2024-12-18 DIAGNOSIS — M48.061 SPINAL STENOSIS OF LUMBAR REGION, UNSPECIFIED WHETHER NEUROGENIC CLAUDICATION PRESENT: ICD-10-CM

## 2024-12-18 DIAGNOSIS — M51.26 HNP (HERNIATED NUCLEUS PULPOSUS), LUMBAR: ICD-10-CM

## 2024-12-18 DIAGNOSIS — M54.16 LUMBAR NEURITIS: Primary | ICD-10-CM

## 2024-12-18 PROCEDURE — 99214 OFFICE O/P EST MOD 30 MIN: CPT | Performed by: PHYSICAL MEDICINE & REHABILITATION

## 2024-12-18 NOTE — PROGRESS NOTES
[facility]  [address] [facility] [address] [facility] [address]  [place] [address]  Tel: [phone]  Fax: [phone]          PROGRESS NOTE      HISTORY OF PRESENT ILLNESS:  The patient is a 64 y.o. male and was seen today for follow up of low back pain radiating to the LLE in a S1 distribution to the calf. Previously seen for left sided low back pain. Previously seen for left-sided lower back pain radiating into the LLE in a L5 distribution to the foot involving  digits 3-5. Previously, he was seen for  progressive left foot paraesthesias. Previously, he was seen for low back pain with paraesthesias extending into the LLE to the great toe. He initially had low back pain radiating  into the RLE medially from the groin to the ankle. His primary complaint at this time is paraesthesias. He denies specific injury or trauma.  He denies hx of surgery or PT. Pt underwent a left-sided S1 SNRB and an L4/5 epidural  on 12/18/18 with good relief. Patient reports that to his knowledge his kidneys function properly, GFR over 60 on 4/11/2024. Pt treated with Prednisone in 9/2020 with benefit. Pt failed NEURONTIN 800 mg TID secondary to minimal benefit. Pt takes Diclofenac.  Pt completed the MDP with temporary benefit. Pt denies fever, weight loss, or skin changes.  Pt denies change in bowel or bladder habits. The patient is RHD. Note from  Lorrie Forde NP dated 11/14/18  indicating patient was seen with c/o low back pain into the left buttocks, extending into the LLE in an S1 distribution for around 11 days. No injury. He was treated with MDP by his PCP without much relief. He was started on Neurontin qhs and given an  Rx for Norco and set up for an MRI. Note from  Lorrie Forde NP dated 9/22/2020  indicating patient was seen with c/o increase in left-sided low back pain  radiating into the LLE to the foot x 3 days without trauma. Indicated his pain was 10/10. Indicated his baseline pain was 3/10. Started him

## 2025-01-17 RX ORDER — TOPIRAMATE 25 MG/1
TABLET, FILM COATED ORAL
Qty: 540 TABLET | Refills: 0 | Status: SHIPPED | OUTPATIENT
Start: 2025-01-17

## 2025-01-29 ENCOUNTER — SCHEDULED TELEPHONE ENCOUNTER (OUTPATIENT)
Age: 65
End: 2025-01-29
Payer: COMMERCIAL

## 2025-01-29 DIAGNOSIS — F41.9 ANXIETY DUE TO INVASIVE PROCEDURE: Primary | ICD-10-CM

## 2025-01-29 PROCEDURE — 99213 OFFICE O/P EST LOW 20 MIN: CPT | Performed by: NURSE PRACTITIONER

## 2025-01-29 RX ORDER — DIAZEPAM 10 MG/1
TABLET ORAL
Qty: 1 TABLET | Refills: 0 | Status: SHIPPED | OUTPATIENT
Start: 2025-01-29 | End: 2025-02-28

## 2025-01-29 NOTE — PROGRESS NOTES
Duong Shen is a 64 y.o. male evaluated via telephone on 1/29/2025 for Pain, Back Problem, and Back Pain  .      History of Present Illness: The patient is a 64-year-old male has back pain with radiating right lower extremity pain down to his calf.  He has had a L5-S1 epidural steroid injection in the past which gave him significant relief.  He states the last 1 was around 2018.  He has been scheduled for a repeat L5-S1 epidural steroid injection coming up in the near future.  He continues on Topamax 75 mg twice a day and Cymbalta 60 mg at night.  They do help but the injection seems to give him more long-lasting effect.  He denies fever bowel bladder dysfunction.    Physical Exam: Patient is a 64-year-old male who was alert and oriented.  He spoke with fluency.  He did not appear to be in distress      Assessment/Plan:.  This is a patient who has lumbar spinal stenosis and herniated disc with degenerative disc disease with back pain and left leg pain.  He will be undergoing a L5-S1 epidural steroid injection.  I have sent in the Valium for his block.  He will take it to the block suite given to the nurse there so she can administer it to him at the appropriate  time.  He will continue his Topamax and Cymbalta.  He does not need refills of them at this time.  We will see him back after the block.    Duong was seen today for pain, back problem and back pain.    Diagnoses and all orders for this visit:    Anxiety due to invasive procedure  -     diazePAM (VALIUM) 10 MG tablet; Take valium to the block suite for the spine injection and give to the nurse there.          Documentation:  I communicated with the patient and/or health care decision maker about back and leg pain.   Details of this discussion including any medical advice provided: Yes    Total Time: 3:36 PM to 3:47 PM    11 minutes    Duong Shen was evaluated through a synchronous (real-time) audio encounter. Patient identification was verified at the

## 2025-01-29 NOTE — PROGRESS NOTES
Duong Shen presents today for   Chief Complaint   Patient presents with    Pain    Back Problem    Back Pain       Is someone accompanying this pt? no    Is the patient using any DME equipment during OV? no    Depression Screening:       No data to display                Learning Assessment:  Failed to redirect to the Timeline version of the [employer] Smart[employer].    Abuse Screening:       No data to display                Fall Risk  Failed to redirect to the Timeline version of the [employer] Smart[employer].    OPIOID RISK TOOL  Failed to redirect to the Timeline version of the [employer] Smart[employer].    Coordination of Care:  1. Have you been to the ER, urgent care clinic since your last visit? no  Hospitalized since your last visit?no    2. Have you seen or consulted any other health care providers outside of the [facility] since your last visit? no Include any pap smears or colon screening. no

## 2025-02-20 RX ORDER — DULOXETIN HYDROCHLORIDE 60 MG/1
60 CAPSULE, DELAYED RELEASE ORAL NIGHTLY
Qty: 90 CAPSULE | Refills: 0 | Status: SHIPPED | OUTPATIENT
Start: 2025-02-20

## 2025-02-28 RX ORDER — DULOXETIN HYDROCHLORIDE 60 MG/1
60 CAPSULE, DELAYED RELEASE ORAL NIGHTLY
Qty: 90 CAPSULE | Refills: 0 | OUTPATIENT
Start: 2025-02-28

## 2025-04-21 ENCOUNTER — OFFICE VISIT (OUTPATIENT)
Age: 65
End: 2025-04-21
Payer: COMMERCIAL

## 2025-04-21 VITALS — WEIGHT: 246 LBS | BODY MASS INDEX: 33.32 KG/M2 | TEMPERATURE: 98 F | HEIGHT: 72 IN

## 2025-04-21 DIAGNOSIS — M51.362 DEGENERATION OF INTERVERTEBRAL DISC OF LUMBAR REGION WITH DISCOGENIC BACK PAIN AND LOWER EXTREMITY PAIN: ICD-10-CM

## 2025-04-21 DIAGNOSIS — M54.16 LUMBAR NEURITIS: Primary | ICD-10-CM

## 2025-04-21 DIAGNOSIS — M48.061 SPINAL STENOSIS OF LUMBAR REGION, UNSPECIFIED WHETHER NEUROGENIC CLAUDICATION PRESENT: ICD-10-CM

## 2025-04-21 DIAGNOSIS — M51.26 HNP (HERNIATED NUCLEUS PULPOSUS), LUMBAR: ICD-10-CM

## 2025-04-21 PROCEDURE — 99214 OFFICE O/P EST MOD 30 MIN: CPT | Performed by: PHYSICAL MEDICINE & REHABILITATION

## 2025-04-21 RX ORDER — TOPIRAMATE 25 MG/1
75 TABLET, FILM COATED ORAL 2 TIMES DAILY
Qty: 540 TABLET | Refills: 0 | Status: SHIPPED | OUTPATIENT
Start: 2025-04-21

## 2025-04-21 RX ORDER — DULOXETIN HYDROCHLORIDE 60 MG/1
60 CAPSULE, DELAYED RELEASE ORAL
Qty: 90 CAPSULE | Refills: 0 | Status: SHIPPED | OUTPATIENT
Start: 2025-04-21

## 2025-04-21 NOTE — PROGRESS NOTES
(Patient not taking: Reported on 4/21/2025) 1 kit 0    sertraline (ZOLOFT) 100 MG tablet 1 tablet 2 times daily (Patient not taking: Reported on 1/29/2025)       No current facility-administered medications for this visit.       No Known Allergies       PHYSICAL EXAMINATION    Temp 98 °F (36.7 °C) (Temporal)   Ht 1.829 m (6')   Wt 111.6 kg (246 lb)   BMI 33.36 kg/m²       CONSTITUTIONAL: NAD, A&O x 3    Ambulates without an assistive device.    MOTOR:  Straight Leg Raise: Negative, Bilaterally     Hip Flex Knee Ext Knee Flex Ankle DF GTE Ankle PF Tone   Right +4/5 +4/5 +4/5 +4/5 +4/5 +4/5 +4/5   Left +4/5 +4/5 +4/5 +4/5 +4/5 +4/5 +4/5     SENSATION: Sensation is intact to light touch throughout.         ASSESSMENT   Duong was seen today for back pain.    Diagnoses and all orders for this visit:    Lumbar neuritis  -     MRI LUMBAR SPINE WO CONTRAST; Future  -     Amb External Referral To Spine Surgery    Degeneration of intervertebral disc of lumbar region with discogenic back pain and lower extremity pain  -     MRI LUMBAR SPINE WO CONTRAST; Future  -     Amb External Referral To Spine Surgery    HNP (herniated nucleus pulposus), lumbar  -     MRI LUMBAR SPINE WO CONTRAST; Future  -     Amb External Referral To Spine Surgery    Spinal stenosis of lumbar region, unspecified whether neurogenic claudication present  -     MRI LUMBAR SPINE WO CONTRAST; Future  -     Amb External Referral To Spine Surgery    Other orders  -     topiramate (TOPAMAX) 25 MG tablet; Take 3 tablets by mouth 2 times daily  -     DULoxetine (CYMBALTA) 60 MG extended release capsule; Take 1 capsule by mouth nightly        IMPRESSION AND PLAN:  Patient returns to the office today with c/o low back pain radiating to the LLE in a S1 distribution to the calf. Multiple treatment options were discussed. Patient would like to proceed with a surgical evaluation, I will refer the patient to Hebert Roland MD. I will order an updated Lumbar spine MRI

## 2025-04-30 ENCOUNTER — HOSPITAL ENCOUNTER (OUTPATIENT)
Facility: HOSPITAL | Age: 65
Discharge: HOME | End: 2025-05-03
Attending: PHYSICAL MEDICINE & REHABILITATION
Payer: COMMERCIAL

## 2025-04-30 DIAGNOSIS — M48.061 SPINAL STENOSIS OF LUMBAR REGION, UNSPECIFIED WHETHER NEUROGENIC CLAUDICATION PRESENT: ICD-10-CM

## 2025-04-30 DIAGNOSIS — M51.26 HNP (HERNIATED NUCLEUS PULPOSUS), LUMBAR: ICD-10-CM

## 2025-04-30 DIAGNOSIS — M51.362 DEGENERATION OF INTERVERTEBRAL DISC OF LUMBAR REGION WITH DISCOGENIC BACK PAIN AND LOWER EXTREMITY PAIN: ICD-10-CM

## 2025-04-30 DIAGNOSIS — M54.16 LUMBAR NEURITIS: ICD-10-CM

## 2025-04-30 PROCEDURE — 72148 MRI LUMBAR SPINE W/O DYE: CPT

## 2025-05-02 ENCOUNTER — RESULTS FOLLOW-UP (OUTPATIENT)
Age: 65
End: 2025-05-02

## 2025-05-02 NOTE — TELEPHONE ENCOUNTER
----- Message from Dr. Pillo Asencio MD sent at 5/2/2025  1:11 PM EDT -----  Renal findings to PCP  ----- Message -----  From: Samia Briones Incoming Orders Results To Radiant  Sent: 5/2/2025  11:56 AM EDT  To: Pillo Asencio MD

## 2025-05-22 RX ORDER — DULOXETIN HYDROCHLORIDE 60 MG/1
60 CAPSULE, DELAYED RELEASE ORAL NIGHTLY
Qty: 90 CAPSULE | Refills: 0 | OUTPATIENT
Start: 2025-05-22

## 2025-06-19 ENCOUNTER — TRANSCRIBE ORDERS (OUTPATIENT)
Age: 65
End: 2025-06-19

## 2025-06-19 ENCOUNTER — HOSPITAL ENCOUNTER (OUTPATIENT)
Age: 65
Discharge: HOME | End: 2025-06-22
Payer: COMMERCIAL

## 2025-06-19 DIAGNOSIS — M54.50 LOW BACK PAIN, UNSPECIFIED BACK PAIN LATERALITY, UNSPECIFIED CHRONICITY, UNSPECIFIED WHETHER SCIATICA PRESENT: ICD-10-CM

## 2025-06-19 DIAGNOSIS — M54.50 LOW BACK PAIN, UNSPECIFIED BACK PAIN LATERALITY, UNSPECIFIED CHRONICITY, UNSPECIFIED WHETHER SCIATICA PRESENT: Primary | ICD-10-CM

## 2025-06-19 PROCEDURE — 72110 X-RAY EXAM L-2 SPINE 4/>VWS: CPT

## 2025-07-14 ENCOUNTER — HOSPITAL ENCOUNTER (OUTPATIENT)
Age: 65
Setting detail: SPECIMEN
Discharge: HOME | End: 2025-07-17

## 2025-07-14 ENCOUNTER — HOSPITAL ENCOUNTER (OUTPATIENT)
Age: 65
Discharge: HOME | End: 2025-07-14

## 2025-07-14 LAB — SENTARA SPECIMEN COLLECTION: NORMAL

## 2025-07-15 LAB
A/G RATIO: 2.3 RATIO (ref 1.1–2.6)
ALBUMIN: 4.4 G/DL (ref 3.5–5)
ALP BLD-CCNC: 68 U/L (ref 40–125)
ALT SERPL-CCNC: 28 U/L (ref 5–40)
ANION GAP SERPL CALCULATED.3IONS-SCNC: 14 MMOL/L (ref 3–15)
AST SERPL-CCNC: 17 U/L (ref 10–37)
BILIRUB SERPL-MCNC: 0.5 MG/DL (ref 0.2–1.2)
BUN BLDV-MCNC: 23 MG/DL (ref 6–22)
CALCIUM SERPL-MCNC: 9.4 MG/DL (ref 8.4–10.5)
CHLORIDE BLD-SCNC: 105 MMOL/L (ref 98–110)
CO2: 20 MMOL/L (ref 20–32)
CREAT SERPL-MCNC: 1.2 MG/DL (ref 0.8–1.6)
EKG ATRIAL RATE: 70 BPM
EKG DIAGNOSIS: NORMAL
EKG P AXIS: 15 DEGREES
EKG P-R INTERVAL: 188 MS
EKG Q-T INTERVAL: 350 MS
EKG QRS DURATION: 80 MS
EKG QTC CALCULATION (BAZETT): 378 MS
EKG R AXIS: -2 DEGREES
EKG T AXIS: 28 DEGREES
EKG VENTRICULAR RATE: 70 BPM
GFR, ESTIMATED: 65.4 ML/MIN/1.73 SQ.M.
GLOBULIN: 1.9 G/DL (ref 2–4)
GLUCOSE: 85 MG/DL (ref 70–99)
HCT VFR BLD CALC: 42.5 % (ref 39.3–51.6)
HEMOGLOBIN: 14.6 G/DL (ref 13.1–17.2)
MCH RBC QN AUTO: 32 PG (ref 26–34)
MCHC RBC AUTO-ENTMCNC: 34 G/DL (ref 31–36)
MCV RBC AUTO: 92 FL (ref 80–95)
PDW BLD-RTO: 13.2 % (ref 10–15.5)
PLATELET # BLD: 131 K/UL (ref 140–440)
PMV BLD AUTO: 11.4 FL (ref 9–13)
POTASSIUM SERPL-SCNC: 4 MMOL/L (ref 3.5–5.5)
RBC # BLD: 4.6 M/UL (ref 3.8–5.8)
SODIUM BLD-SCNC: 139 MMOL/L (ref 133–145)
TOTAL PROTEIN: 6.3 G/DL (ref 6.2–8.1)
WBC # BLD: 4.8 K/UL (ref 4–11)

## 2025-07-28 RX ORDER — ATORVASTATIN CALCIUM 20 MG/1
20 TABLET, FILM COATED ORAL NIGHTLY
COMMUNITY

## 2025-08-01 ENCOUNTER — ANESTHESIA EVENT (OUTPATIENT)
Facility: HOSPITAL | Age: 65
End: 2025-08-01
Payer: COMMERCIAL

## 2025-08-04 ENCOUNTER — HOSPITAL ENCOUNTER (OUTPATIENT)
Facility: HOSPITAL | Age: 65
Setting detail: OBSERVATION
Discharge: HOME | End: 2025-08-05
Attending: ORTHOPAEDIC SURGERY | Admitting: ORTHOPAEDIC SURGERY
Payer: COMMERCIAL

## 2025-08-04 ENCOUNTER — APPOINTMENT (OUTPATIENT)
Facility: HOSPITAL | Age: 65
End: 2025-08-04
Attending: ORTHOPAEDIC SURGERY
Payer: COMMERCIAL

## 2025-08-04 ENCOUNTER — ANESTHESIA (OUTPATIENT)
Facility: HOSPITAL | Age: 65
End: 2025-08-04
Payer: COMMERCIAL

## 2025-08-04 DIAGNOSIS — Z98.1 S/P LUMBAR SPINAL FUSION: Primary | ICD-10-CM

## 2025-08-04 PROBLEM — M48.061 SPINAL STENOSIS OF LUMBAR REGION AT MULTIPLE LEVELS: Status: ACTIVE | Noted: 2025-08-04

## 2025-08-04 PROCEDURE — 6370000000 HC RX 637 (ALT 250 FOR IP): Performed by: ORTHOPAEDIC SURGERY

## 2025-08-04 PROCEDURE — G0378 HOSPITAL OBSERVATION PER HR: HCPCS

## 2025-08-04 PROCEDURE — 6360000002 HC RX W HCPCS: Performed by: ORTHOPAEDIC SURGERY

## 2025-08-04 PROCEDURE — 3700000001 HC ADD 15 MINUTES (ANESTHESIA): Performed by: ORTHOPAEDIC SURGERY

## 2025-08-04 PROCEDURE — 6370000000 HC RX 637 (ALT 250 FOR IP): Performed by: NURSE ANESTHETIST, CERTIFIED REGISTERED

## 2025-08-04 PROCEDURE — C1729 CATH, DRAINAGE: HCPCS | Performed by: ORTHOPAEDIC SURGERY

## 2025-08-04 PROCEDURE — 2500000003 HC RX 250 WO HCPCS: Performed by: ORTHOPAEDIC SURGERY

## 2025-08-04 PROCEDURE — 3700000000 HC ANESTHESIA ATTENDED CARE: Performed by: ORTHOPAEDIC SURGERY

## 2025-08-04 PROCEDURE — P9045 ALBUMIN (HUMAN), 5%, 250 ML: HCPCS

## 2025-08-04 PROCEDURE — 3600000014 HC SURGERY LEVEL 4 ADDTL 15MIN: Performed by: ORTHOPAEDIC SURGERY

## 2025-08-04 PROCEDURE — 2580000003 HC RX 258: Performed by: ORTHOPAEDIC SURGERY

## 2025-08-04 PROCEDURE — 6360000002 HC RX W HCPCS: Performed by: NURSE PRACTITIONER

## 2025-08-04 PROCEDURE — 6360000002 HC RX W HCPCS

## 2025-08-04 PROCEDURE — 7100000001 HC PACU RECOVERY - ADDTL 15 MIN: Performed by: ORTHOPAEDIC SURGERY

## 2025-08-04 PROCEDURE — 7100000000 HC PACU RECOVERY - FIRST 15 MIN: Performed by: ORTHOPAEDIC SURGERY

## 2025-08-04 PROCEDURE — 2709999900 HC NON-CHARGEABLE SUPPLY: Performed by: ORTHOPAEDIC SURGERY

## 2025-08-04 PROCEDURE — 2500000003 HC RX 250 WO HCPCS: Performed by: NURSE PRACTITIONER

## 2025-08-04 PROCEDURE — C1713 ANCHOR/SCREW BN/BN,TIS/BN: HCPCS | Performed by: ORTHOPAEDIC SURGERY

## 2025-08-04 PROCEDURE — 2500000003 HC RX 250 WO HCPCS

## 2025-08-04 PROCEDURE — 3600000004 HC SURGERY LEVEL 4 BASE: Performed by: ORTHOPAEDIC SURGERY

## 2025-08-04 PROCEDURE — 2720000010 HC SURG SUPPLY STERILE: Performed by: ORTHOPAEDIC SURGERY

## 2025-08-04 PROCEDURE — 2580000003 HC RX 258

## 2025-08-04 DEVICE — IMPLANTABLE DEVICE: Type: IMPLANTABLE DEVICE | Site: SPINE LUMBAR | Status: FUNCTIONAL

## 2025-08-04 RX ORDER — VASOPRESSIN 20 [USP'U]/ML
INJECTION, SOLUTION INTRAVENOUS
Status: DISCONTINUED | OUTPATIENT
Start: 2025-08-04 | End: 2025-08-04 | Stop reason: SDUPTHER

## 2025-08-04 RX ORDER — FENTANYL CITRATE 50 UG/ML
50 INJECTION, SOLUTION INTRAMUSCULAR; INTRAVENOUS EVERY 5 MIN PRN
Status: COMPLETED | OUTPATIENT
Start: 2025-08-04 | End: 2025-08-04

## 2025-08-04 RX ORDER — FAMOTIDINE 20 MG/1
20 TABLET, FILM COATED ORAL ONCE
Status: COMPLETED | OUTPATIENT
Start: 2025-08-04 | End: 2025-08-04

## 2025-08-04 RX ORDER — LISINOPRIL 20 MG/1
20 TABLET ORAL NIGHTLY
Status: DISCONTINUED | OUTPATIENT
Start: 2025-08-04 | End: 2025-08-05 | Stop reason: HOSPADM

## 2025-08-04 RX ORDER — DEXAMETHASONE SODIUM PHOSPHATE 4 MG/ML
INJECTION, SOLUTION INTRA-ARTICULAR; INTRALESIONAL; INTRAMUSCULAR; INTRAVENOUS; SOFT TISSUE
Status: DISCONTINUED | OUTPATIENT
Start: 2025-08-04 | End: 2025-08-04 | Stop reason: SDUPTHER

## 2025-08-04 RX ORDER — SUCCINYLCHOLINE/SOD CL,ISO/PF 100 MG/5ML
SYRINGE (ML) INTRAVENOUS
Status: DISCONTINUED | OUTPATIENT
Start: 2025-08-04 | End: 2025-08-04 | Stop reason: SDUPTHER

## 2025-08-04 RX ORDER — ONDANSETRON 2 MG/ML
4 INJECTION INTRAMUSCULAR; INTRAVENOUS EVERY 6 HOURS PRN
Status: DISCONTINUED | OUTPATIENT
Start: 2025-08-04 | End: 2025-08-05 | Stop reason: HOSPADM

## 2025-08-04 RX ORDER — DIPHENHYDRAMINE HCL 25 MG
25 CAPSULE ORAL EVERY 6 HOURS PRN
Status: DISCONTINUED | OUTPATIENT
Start: 2025-08-04 | End: 2025-08-05 | Stop reason: HOSPADM

## 2025-08-04 RX ORDER — VANCOMYCIN HYDROCHLORIDE 1 G/20ML
INJECTION, POWDER, LYOPHILIZED, FOR SOLUTION INTRAVENOUS PRN
Status: DISCONTINUED | OUTPATIENT
Start: 2025-08-04 | End: 2025-08-04 | Stop reason: ALTCHOICE

## 2025-08-04 RX ORDER — FENTANYL CITRATE 50 UG/ML
INJECTION, SOLUTION INTRAMUSCULAR; INTRAVENOUS
Status: COMPLETED
Start: 2025-08-04 | End: 2025-08-04

## 2025-08-04 RX ORDER — DIPHENHYDRAMINE HYDROCHLORIDE 50 MG/ML
12.5 INJECTION, SOLUTION INTRAMUSCULAR; INTRAVENOUS
Status: DISCONTINUED | OUTPATIENT
Start: 2025-08-04 | End: 2025-08-04 | Stop reason: HOSPADM

## 2025-08-04 RX ORDER — TAMSULOSIN HYDROCHLORIDE 0.4 MG/1
0.4 CAPSULE ORAL DAILY
Status: DISCONTINUED | OUTPATIENT
Start: 2025-08-05 | End: 2025-08-05 | Stop reason: HOSPADM

## 2025-08-04 RX ORDER — SODIUM CHLORIDE, SODIUM LACTATE, POTASSIUM CHLORIDE, CALCIUM CHLORIDE 600; 310; 30; 20 MG/100ML; MG/100ML; MG/100ML; MG/100ML
INJECTION, SOLUTION INTRAVENOUS
Status: DISCONTINUED | OUTPATIENT
Start: 2025-08-04 | End: 2025-08-04 | Stop reason: SDUPTHER

## 2025-08-04 RX ORDER — FAMOTIDINE 20 MG/1
20 TABLET, FILM COATED ORAL 2 TIMES DAILY
Status: DISCONTINUED | OUTPATIENT
Start: 2025-08-04 | End: 2025-08-05 | Stop reason: HOSPADM

## 2025-08-04 RX ORDER — PROMETHAZINE HYDROCHLORIDE 12.5 MG/1
12.5 TABLET ORAL ONCE
Status: COMPLETED | OUTPATIENT
Start: 2025-08-04 | End: 2025-08-04

## 2025-08-04 RX ORDER — SODIUM CHLORIDE 0.9 % (FLUSH) 0.9 %
5-40 SYRINGE (ML) INJECTION EVERY 12 HOURS SCHEDULED
Status: DISCONTINUED | OUTPATIENT
Start: 2025-08-04 | End: 2025-08-04 | Stop reason: HOSPADM

## 2025-08-04 RX ORDER — SODIUM CHLORIDE 0.9 % (FLUSH) 0.9 %
5-40 SYRINGE (ML) INJECTION PRN
Status: DISCONTINUED | OUTPATIENT
Start: 2025-08-04 | End: 2025-08-04 | Stop reason: HOSPADM

## 2025-08-04 RX ORDER — ALBUMIN HUMAN 50 G/1000ML
SOLUTION INTRAVENOUS
Status: DISCONTINUED | OUTPATIENT
Start: 2025-08-04 | End: 2025-08-04 | Stop reason: SDUPTHER

## 2025-08-04 RX ORDER — ACETAMINOPHEN 500 MG
1000 TABLET ORAL ONCE
Status: COMPLETED | OUTPATIENT
Start: 2025-08-04 | End: 2025-08-04

## 2025-08-04 RX ORDER — PROPOFOL 10 MG/ML
INJECTION, EMULSION INTRAVENOUS
Status: DISCONTINUED | OUTPATIENT
Start: 2025-08-04 | End: 2025-08-04 | Stop reason: SDUPTHER

## 2025-08-04 RX ORDER — FENTANYL CITRATE 50 UG/ML
INJECTION, SOLUTION INTRAMUSCULAR; INTRAVENOUS
Status: DISCONTINUED | OUTPATIENT
Start: 2025-08-04 | End: 2025-08-04 | Stop reason: SDUPTHER

## 2025-08-04 RX ORDER — ONDANSETRON 2 MG/ML
4 INJECTION INTRAMUSCULAR; INTRAVENOUS
Status: DISCONTINUED | OUTPATIENT
Start: 2025-08-04 | End: 2025-08-04 | Stop reason: HOSPADM

## 2025-08-04 RX ORDER — LIDOCAINE HYDROCHLORIDE 20 MG/ML
INJECTION, SOLUTION EPIDURAL; INFILTRATION; INTRACAUDAL; PERINEURAL
Status: DISCONTINUED | OUTPATIENT
Start: 2025-08-04 | End: 2025-08-04 | Stop reason: SDUPTHER

## 2025-08-04 RX ORDER — PANTOPRAZOLE SODIUM 40 MG/1
40 TABLET, DELAYED RELEASE ORAL
Status: DISCONTINUED | OUTPATIENT
Start: 2025-08-05 | End: 2025-08-04 | Stop reason: SDUPTHER

## 2025-08-04 RX ORDER — METAXALONE 800 MG/1
800 TABLET ORAL EVERY 8 HOURS PRN
Status: DISCONTINUED | OUTPATIENT
Start: 2025-08-04 | End: 2025-08-05 | Stop reason: HOSPADM

## 2025-08-04 RX ORDER — ONDANSETRON 4 MG/1
4 TABLET, ORALLY DISINTEGRATING ORAL EVERY 8 HOURS PRN
Status: DISCONTINUED | OUTPATIENT
Start: 2025-08-04 | End: 2025-08-05 | Stop reason: HOSPADM

## 2025-08-04 RX ORDER — OXYCODONE HYDROCHLORIDE 10 MG/1
10 TABLET ORAL EVERY 4 HOURS PRN
Status: DISCONTINUED | OUTPATIENT
Start: 2025-08-04 | End: 2025-08-05 | Stop reason: HOSPADM

## 2025-08-04 RX ORDER — ROCURONIUM BROMIDE 10 MG/ML
INJECTION, SOLUTION INTRAVENOUS
Status: DISCONTINUED | OUTPATIENT
Start: 2025-08-04 | End: 2025-08-04 | Stop reason: SDUPTHER

## 2025-08-04 RX ORDER — LIDOCAINE HYDROCHLORIDE 10 MG/ML
1 INJECTION, SOLUTION EPIDURAL; INFILTRATION; INTRACAUDAL; PERINEURAL
Status: DISCONTINUED | OUTPATIENT
Start: 2025-08-04 | End: 2025-08-04 | Stop reason: HOSPADM

## 2025-08-04 RX ORDER — DULOXETIN HYDROCHLORIDE 60 MG/1
60 CAPSULE, DELAYED RELEASE ORAL
Status: DISCONTINUED | OUTPATIENT
Start: 2025-08-04 | End: 2025-08-05 | Stop reason: HOSPADM

## 2025-08-04 RX ORDER — DIPHENHYDRAMINE HYDROCHLORIDE 50 MG/ML
25 INJECTION, SOLUTION INTRAMUSCULAR; INTRAVENOUS EVERY 6 HOURS PRN
Status: DISCONTINUED | OUTPATIENT
Start: 2025-08-04 | End: 2025-08-05 | Stop reason: HOSPADM

## 2025-08-04 RX ORDER — FINASTERIDE 5 MG/1
5 TABLET, FILM COATED ORAL DAILY
Status: DISCONTINUED | OUTPATIENT
Start: 2025-08-04 | End: 2025-08-05 | Stop reason: HOSPADM

## 2025-08-04 RX ORDER — SODIUM CHLORIDE 9 MG/ML
INJECTION, SOLUTION INTRAVENOUS PRN
Status: DISCONTINUED | OUTPATIENT
Start: 2025-08-04 | End: 2025-08-04 | Stop reason: HOSPADM

## 2025-08-04 RX ORDER — EPHEDRINE SULFATE/0.9% NACL/PF 50 MG/5 ML
SYRINGE (ML) INTRAVENOUS
Status: DISCONTINUED | OUTPATIENT
Start: 2025-08-04 | End: 2025-08-04 | Stop reason: SDUPTHER

## 2025-08-04 RX ORDER — POLYETHYLENE GLYCOL 3350 17 G/17G
17 POWDER, FOR SOLUTION ORAL DAILY
Status: DISCONTINUED | OUTPATIENT
Start: 2025-08-04 | End: 2025-08-05 | Stop reason: HOSPADM

## 2025-08-04 RX ORDER — PHENYLEPHRINE HCL IN 0.9% NACL 1 MG/10 ML
SYRINGE (ML) INTRAVENOUS
Status: DISCONTINUED | OUTPATIENT
Start: 2025-08-04 | End: 2025-08-04 | Stop reason: SDUPTHER

## 2025-08-04 RX ORDER — OXYCODONE HYDROCHLORIDE 5 MG/1
5 TABLET ORAL EVERY 4 HOURS PRN
Status: DISCONTINUED | OUTPATIENT
Start: 2025-08-04 | End: 2025-08-05 | Stop reason: HOSPADM

## 2025-08-04 RX ORDER — PREGABALIN 75 MG/1
75 CAPSULE ORAL ONCE
Status: COMPLETED | OUTPATIENT
Start: 2025-08-04 | End: 2025-08-04

## 2025-08-04 RX ORDER — SODIUM CHLORIDE 450 MG/100ML
INJECTION, SOLUTION INTRAVENOUS CONTINUOUS
Status: DISCONTINUED | OUTPATIENT
Start: 2025-08-04 | End: 2025-08-05 | Stop reason: HOSPADM

## 2025-08-04 RX ORDER — TOPIRAMATE 25 MG/1
75 TABLET, FILM COATED ORAL 2 TIMES DAILY
Status: DISCONTINUED | OUTPATIENT
Start: 2025-08-04 | End: 2025-08-05 | Stop reason: HOSPADM

## 2025-08-04 RX ORDER — SODIUM CHLORIDE 0.9 % (FLUSH) 0.9 %
5-40 SYRINGE (ML) INJECTION EVERY 12 HOURS SCHEDULED
Status: DISCONTINUED | OUTPATIENT
Start: 2025-08-04 | End: 2025-08-05 | Stop reason: HOSPADM

## 2025-08-04 RX ORDER — BISACODYL 5 MG/1
5 TABLET, DELAYED RELEASE ORAL DAILY
Status: DISCONTINUED | OUTPATIENT
Start: 2025-08-04 | End: 2025-08-05 | Stop reason: HOSPADM

## 2025-08-04 RX ORDER — ATORVASTATIN CALCIUM 20 MG/1
20 TABLET, FILM COATED ORAL NIGHTLY
Status: DISCONTINUED | OUTPATIENT
Start: 2025-08-04 | End: 2025-08-05 | Stop reason: HOSPADM

## 2025-08-04 RX ORDER — ACETAMINOPHEN 325 MG/1
650 TABLET ORAL EVERY 6 HOURS
Status: DISCONTINUED | OUTPATIENT
Start: 2025-08-04 | End: 2025-08-05 | Stop reason: HOSPADM

## 2025-08-04 RX ORDER — MIDAZOLAM HYDROCHLORIDE 1 MG/ML
INJECTION, SOLUTION INTRAMUSCULAR; INTRAVENOUS
Status: DISCONTINUED | OUTPATIENT
Start: 2025-08-04 | End: 2025-08-04 | Stop reason: SDUPTHER

## 2025-08-04 RX ORDER — ONDANSETRON 2 MG/ML
INJECTION INTRAMUSCULAR; INTRAVENOUS
Status: DISCONTINUED | OUTPATIENT
Start: 2025-08-04 | End: 2025-08-04 | Stop reason: SDUPTHER

## 2025-08-04 RX ORDER — TAMSULOSIN HYDROCHLORIDE 0.4 MG/1
0.4 CAPSULE ORAL ONCE
Status: COMPLETED | OUTPATIENT
Start: 2025-08-04 | End: 2025-08-04

## 2025-08-04 RX ADMIN — FENTANYL CITRATE 50 MCG: 50 INJECTION INTRAMUSCULAR; INTRAVENOUS at 14:10

## 2025-08-04 RX ADMIN — Medication 100 MCG: at 12:27

## 2025-08-04 RX ADMIN — FINASTERIDE 5 MG: 5 TABLET, FILM COATED ORAL at 21:15

## 2025-08-04 RX ADMIN — ROCURONIUM BROMIDE 45 MG: 10 INJECTION, SOLUTION INTRAVENOUS at 11:53

## 2025-08-04 RX ADMIN — TAMSULOSIN HYDROCHLORIDE 0.4 MG: 0.4 CAPSULE ORAL at 09:35

## 2025-08-04 RX ADMIN — Medication 100 MCG: at 12:12

## 2025-08-04 RX ADMIN — BISACODYL 5 MG: 5 TABLET, COATED ORAL at 21:15

## 2025-08-04 RX ADMIN — VASOPRESSIN 2 UNITS: 20 INJECTION INTRAVENOUS at 12:45

## 2025-08-04 RX ADMIN — DEXAMETHASONE SODIUM PHOSPHATE 4 MG: 4 INJECTION INTRA-ARTICULAR; INTRALESIONAL; INTRAMUSCULAR; INTRAVENOUS; SOFT TISSUE at 11:57

## 2025-08-04 RX ADMIN — CEFAZOLIN 2000 MG: 1 INJECTION, POWDER, FOR SOLUTION INTRAMUSCULAR; INTRAVENOUS at 21:14

## 2025-08-04 RX ADMIN — LIDOCAINE HYDROCHLORIDE 50 MG: 20 INJECTION, SOLUTION EPIDURAL; INFILTRATION; INTRACAUDAL; PERINEURAL at 11:42

## 2025-08-04 RX ADMIN — TRANEXAMIC ACID 1000 MG: 100 INJECTION, SOLUTION INTRAVENOUS at 12:00

## 2025-08-04 RX ADMIN — VASOPRESSIN 2 UNITS: 20 INJECTION INTRAVENOUS at 12:30

## 2025-08-04 RX ADMIN — Medication 180 MG: at 11:42

## 2025-08-04 RX ADMIN — ACETAMINOPHEN 1000 MG: 500 TABLET ORAL at 09:35

## 2025-08-04 RX ADMIN — FENTANYL CITRATE 50 MCG: 50 INJECTION, SOLUTION INTRAMUSCULAR; INTRAVENOUS at 15:40

## 2025-08-04 RX ADMIN — DULOXETINE HYDROCHLORIDE 60 MG: 60 CAPSULE, DELAYED RELEASE ORAL at 21:14

## 2025-08-04 RX ADMIN — FENTANYL CITRATE 50 MCG: 50 INJECTION INTRAMUSCULAR; INTRAVENOUS at 13:30

## 2025-08-04 RX ADMIN — SODIUM CHLORIDE: 0.45 INJECTION, SOLUTION INTRAVENOUS at 19:34

## 2025-08-04 RX ADMIN — FAMOTIDINE 20 MG: 20 TABLET, FILM COATED ORAL at 21:15

## 2025-08-04 RX ADMIN — ATORVASTATIN CALCIUM 20 MG: 20 TABLET, FILM COATED ORAL at 21:14

## 2025-08-04 RX ADMIN — PREGABALIN 75 MG: 75 CAPSULE ORAL at 09:36

## 2025-08-04 RX ADMIN — ROCURONIUM BROMIDE 10 MG: 10 INJECTION, SOLUTION INTRAVENOUS at 13:20

## 2025-08-04 RX ADMIN — FENTANYL CITRATE 50 MCG: 50 INJECTION INTRAMUSCULAR; INTRAVENOUS at 11:42

## 2025-08-04 RX ADMIN — Medication 5 MG: at 12:27

## 2025-08-04 RX ADMIN — FAMOTIDINE 20 MG: 20 TABLET, FILM COATED ORAL at 09:36

## 2025-08-04 RX ADMIN — ONDANSETRON 4 MG: 2 INJECTION INTRAMUSCULAR; INTRAVENOUS at 11:57

## 2025-08-04 RX ADMIN — ROCURONIUM BROMIDE 5 MG: 10 INJECTION, SOLUTION INTRAVENOUS at 11:42

## 2025-08-04 RX ADMIN — MIDAZOLAM 2 MG: 1 INJECTION, SOLUTION INTRAMUSCULAR; INTRAVENOUS at 11:42

## 2025-08-04 RX ADMIN — FENTANYL CITRATE 50 MCG: 50 INJECTION INTRAMUSCULAR; INTRAVENOUS at 12:05

## 2025-08-04 RX ADMIN — SODIUM CHLORIDE, SODIUM LACTATE, POTASSIUM CHLORIDE, AND CALCIUM CHLORIDE: 600; 310; 30; 20 INJECTION, SOLUTION INTRAVENOUS at 11:30

## 2025-08-04 RX ADMIN — Medication 5 MG: at 12:46

## 2025-08-04 RX ADMIN — PROPOFOL 150 MG: 10 INJECTION, EMULSION INTRAVENOUS at 11:42

## 2025-08-04 RX ADMIN — ACETAMINOPHEN 650 MG: 325 TABLET ORAL at 21:14

## 2025-08-04 RX ADMIN — PROMETHAZINE HYDROCHLORIDE 12.5 MG: 12.5 TABLET ORAL at 09:35

## 2025-08-04 RX ADMIN — LISINOPRIL 20 MG: 20 TABLET ORAL at 21:15

## 2025-08-04 RX ADMIN — OXYCODONE HYDROCHLORIDE 10 MG: 10 TABLET ORAL at 22:28

## 2025-08-04 RX ADMIN — HYDROMORPHONE HYDROCHLORIDE 0.25 MG: 1 INJECTION, SOLUTION INTRAMUSCULAR; INTRAVENOUS; SUBCUTANEOUS at 15:58

## 2025-08-04 RX ADMIN — WATER 2000 MG: 1 INJECTION INTRAMUSCULAR; INTRAVENOUS; SUBCUTANEOUS at 11:54

## 2025-08-04 RX ADMIN — ROCURONIUM BROMIDE 10 MG: 10 INJECTION, SOLUTION INTRAVENOUS at 12:50

## 2025-08-04 RX ADMIN — SUGAMMADEX 200 MG: 100 INJECTION, SOLUTION INTRAVENOUS at 14:07

## 2025-08-04 RX ADMIN — ALBUMIN (HUMAN) 12.5 G: 12.5 SOLUTION INTRAVENOUS at 12:53

## 2025-08-04 RX ADMIN — TOPIRAMATE 75 MG: 25 TABLET, FILM COATED ORAL at 21:15

## 2025-08-04 RX ADMIN — POLYETHYLENE GLYCOL 3350 17 G: 17 POWDER, FOR SOLUTION ORAL at 21:14

## 2025-08-04 RX ADMIN — Medication 10 MG: at 11:48

## 2025-08-04 RX ADMIN — FENTANYL CITRATE 50 MCG: 50 INJECTION, SOLUTION INTRAMUSCULAR; INTRAVENOUS at 17:20

## 2025-08-04 RX ADMIN — SODIUM CHLORIDE, PRESERVATIVE FREE 10 ML: 5 INJECTION INTRAVENOUS at 21:17

## 2025-08-04 RX ADMIN — Medication 100 MCG: at 12:19

## 2025-08-04 RX ADMIN — TRANEXAMIC ACID 1000 MG: 100 INJECTION, SOLUTION INTRAVENOUS at 13:46

## 2025-08-04 ASSESSMENT — PAIN SCALES - GENERAL
PAINLEVEL_OUTOF10: 2
PAINLEVEL_OUTOF10: 2
PAINLEVEL_OUTOF10: 7
PAINLEVEL_OUTOF10: 2
PAINLEVEL_OUTOF10: 3
PAINLEVEL_OUTOF10: 0
PAINLEVEL_OUTOF10: 7
PAINLEVEL_OUTOF10: 4
PAINLEVEL_OUTOF10: 7
PAINLEVEL_OUTOF10: 3

## 2025-08-04 ASSESSMENT — PAIN - FUNCTIONAL ASSESSMENT
PAIN_FUNCTIONAL_ASSESSMENT: ACTIVITIES ARE NOT PREVENTED
PAIN_FUNCTIONAL_ASSESSMENT: 0-10
PAIN_FUNCTIONAL_ASSESSMENT: ACTIVITIES ARE NOT PREVENTED
PAIN_FUNCTIONAL_ASSESSMENT: ACTIVITIES ARE NOT PREVENTED

## 2025-08-04 ASSESSMENT — PAIN DESCRIPTION - DESCRIPTORS
DESCRIPTORS: ACHING;DISCOMFORT;THROBBING;TENDER
DESCRIPTORS: THROBBING
DESCRIPTORS: THROBBING
DESCRIPTORS: ACHING;TENDER
DESCRIPTORS: ACHING;THROBBING
DESCRIPTORS: THROBBING
DESCRIPTORS: THROBBING

## 2025-08-04 ASSESSMENT — PAIN DESCRIPTION - LOCATION
LOCATION: BACK

## 2025-08-04 ASSESSMENT — PAIN DESCRIPTION - PAIN TYPE
TYPE: ACUTE PAIN;SURGICAL PAIN
TYPE: SURGICAL PAIN

## 2025-08-04 ASSESSMENT — PAIN DESCRIPTION - FREQUENCY
FREQUENCY: INTERMITTENT

## 2025-08-04 ASSESSMENT — PAIN DESCRIPTION - ORIENTATION
ORIENTATION: MID
ORIENTATION: POSTERIOR;MID
ORIENTATION: MID
ORIENTATION: POSTERIOR;MID
ORIENTATION: MID
ORIENTATION: MID;POSTERIOR
ORIENTATION: POSTERIOR

## 2025-08-04 ASSESSMENT — PAIN DESCRIPTION - RADICULAR PAIN: RADICULAR_PAIN: ABSENT

## 2025-08-04 ASSESSMENT — PAIN DESCRIPTION - ONSET
ONSET: GRADUAL

## 2025-08-05 VITALS
TEMPERATURE: 98 F | HEART RATE: 67 BPM | OXYGEN SATURATION: 96 % | RESPIRATION RATE: 18 BRPM | SYSTOLIC BLOOD PRESSURE: 151 MMHG | WEIGHT: 225.4 LBS | HEIGHT: 72 IN | BODY MASS INDEX: 30.53 KG/M2 | DIASTOLIC BLOOD PRESSURE: 86 MMHG

## 2025-08-05 LAB
ANION GAP SERPL CALC-SCNC: 11 MMOL/L (ref 3–18)
BUN SERPL-MCNC: 21 MG/DL (ref 6–23)
BUN/CREAT SERPL: 19 (ref 12–20)
CALCIUM SERPL-MCNC: 8.6 MG/DL (ref 8.5–10.1)
CHLORIDE SERPL-SCNC: 109 MMOL/L (ref 98–107)
CO2 SERPL-SCNC: 20 MMOL/L (ref 21–32)
CREAT SERPL-MCNC: 1.09 MG/DL (ref 0.6–1.3)
ERYTHROCYTE [DISTWIDTH] IN BLOOD BY AUTOMATED COUNT: 13.2 % (ref 11.6–14.5)
GLUCOSE SERPL-MCNC: 143 MG/DL (ref 74–108)
HCT VFR BLD AUTO: 32.5 % (ref 36–48)
HGB BLD-MCNC: 11.1 G/DL (ref 13–16)
MCH RBC QN AUTO: 31.9 PG (ref 24–34)
MCHC RBC AUTO-ENTMCNC: 34.2 G/DL (ref 31–37)
MCV RBC AUTO: 93.4 FL (ref 78–100)
NRBC # BLD: 0 K/UL (ref 0–0.01)
NRBC BLD-RTO: 0 PER 100 WBC
PLATELET # BLD AUTO: 110 K/UL (ref 135–420)
PMV BLD AUTO: 11.5 FL (ref 9.2–11.8)
POTASSIUM SERPL-SCNC: 3.4 MMOL/L (ref 3.5–5.5)
RBC # BLD AUTO: 3.48 M/UL (ref 4.35–5.65)
SODIUM SERPL-SCNC: 140 MMOL/L (ref 136–145)
WBC # BLD AUTO: 7.5 K/UL (ref 4.6–13.2)

## 2025-08-05 PROCEDURE — 96365 THER/PROPH/DIAG IV INF INIT: CPT

## 2025-08-05 PROCEDURE — G0378 HOSPITAL OBSERVATION PER HR: HCPCS

## 2025-08-05 PROCEDURE — 6370000000 HC RX 637 (ALT 250 FOR IP): Performed by: ORTHOPAEDIC SURGERY

## 2025-08-05 PROCEDURE — 36415 COLL VENOUS BLD VENIPUNCTURE: CPT

## 2025-08-05 PROCEDURE — 97162 PT EVAL MOD COMPLEX 30 MIN: CPT

## 2025-08-05 PROCEDURE — 2580000003 HC RX 258: Performed by: ORTHOPAEDIC SURGERY

## 2025-08-05 PROCEDURE — 6360000002 HC RX W HCPCS: Performed by: ORTHOPAEDIC SURGERY

## 2025-08-05 PROCEDURE — 94761 N-INVAS EAR/PLS OXIMETRY MLT: CPT

## 2025-08-05 PROCEDURE — 80048 BASIC METABOLIC PNL TOTAL CA: CPT

## 2025-08-05 PROCEDURE — 97535 SELF CARE MNGMENT TRAINING: CPT

## 2025-08-05 PROCEDURE — 85027 COMPLETE CBC AUTOMATED: CPT

## 2025-08-05 PROCEDURE — 2500000003 HC RX 250 WO HCPCS: Performed by: ORTHOPAEDIC SURGERY

## 2025-08-05 PROCEDURE — 97116 GAIT TRAINING THERAPY: CPT

## 2025-08-05 PROCEDURE — 97165 OT EVAL LOW COMPLEX 30 MIN: CPT

## 2025-08-05 RX ORDER — DULOXETIN HYDROCHLORIDE 60 MG/1
60 CAPSULE, DELAYED RELEASE ORAL
Qty: 90 CAPSULE | Refills: 0 | Status: SHIPPED | OUTPATIENT
Start: 2025-08-05

## 2025-08-05 RX ORDER — TOPIRAMATE 25 MG/1
75 TABLET, FILM COATED ORAL 2 TIMES DAILY
Qty: 540 TABLET | Refills: 0 | Status: SHIPPED | OUTPATIENT
Start: 2025-08-05

## 2025-08-05 RX ORDER — OXYCODONE HYDROCHLORIDE 5 MG/1
5 TABLET ORAL EVERY 6 HOURS PRN
Qty: 28 TABLET | Refills: 0 | Status: SHIPPED | OUTPATIENT
Start: 2025-08-05 | End: 2025-08-12

## 2025-08-05 RX ADMIN — ACETAMINOPHEN 650 MG: 325 TABLET ORAL at 03:59

## 2025-08-05 RX ADMIN — OXYCODONE HYDROCHLORIDE 10 MG: 10 TABLET ORAL at 03:59

## 2025-08-05 RX ADMIN — TAMSULOSIN HYDROCHLORIDE 0.4 MG: 0.4 CAPSULE ORAL at 08:27

## 2025-08-05 RX ADMIN — TRANEXAMIC ACID 1000 MG: 100 INJECTION INTRAVENOUS at 06:36

## 2025-08-05 RX ADMIN — TOPIRAMATE 75 MG: 25 TABLET, FILM COATED ORAL at 08:27

## 2025-08-05 RX ADMIN — ACETAMINOPHEN 650 MG: 325 TABLET ORAL at 08:27

## 2025-08-05 RX ADMIN — FAMOTIDINE 20 MG: 20 TABLET, FILM COATED ORAL at 08:28

## 2025-08-05 RX ADMIN — BISACODYL 5 MG: 5 TABLET, COATED ORAL at 08:27

## 2025-08-05 RX ADMIN — OXYCODONE HYDROCHLORIDE 5 MG: 5 TABLET ORAL at 10:17

## 2025-08-05 RX ADMIN — SODIUM CHLORIDE, PRESERVATIVE FREE 10 ML: 5 INJECTION INTRAVENOUS at 08:35

## 2025-08-05 RX ADMIN — CEFAZOLIN 2000 MG: 1 INJECTION, POWDER, FOR SOLUTION INTRAMUSCULAR; INTRAVENOUS at 04:07

## 2025-08-05 ASSESSMENT — PAIN SCALES - GENERAL
PAINLEVEL_OUTOF10: 2
PAINLEVEL_OUTOF10: 7
PAINLEVEL_OUTOF10: 5
PAINLEVEL_OUTOF10: 4

## 2025-08-05 ASSESSMENT — PAIN DESCRIPTION - FREQUENCY: FREQUENCY: INTERMITTENT

## 2025-08-05 ASSESSMENT — PAIN DESCRIPTION - LOCATION
LOCATION: ABDOMEN
LOCATION: BACK

## 2025-08-05 ASSESSMENT — PAIN DESCRIPTION - ONSET: ONSET: GRADUAL

## 2025-08-05 ASSESSMENT — PAIN DESCRIPTION - PAIN TYPE: TYPE: SURGICAL PAIN

## 2025-08-05 ASSESSMENT — PAIN DESCRIPTION - DESCRIPTORS: DESCRIPTORS: ACHING;DISCOMFORT;TENDER;THROBBING

## 2025-08-05 ASSESSMENT — PAIN - FUNCTIONAL ASSESSMENT: PAIN_FUNCTIONAL_ASSESSMENT: ACTIVITIES ARE NOT PREVENTED

## 2025-08-05 ASSESSMENT — PAIN DESCRIPTION - ORIENTATION: ORIENTATION: MID

## 2025-08-06 ENCOUNTER — TELEPHONE (OUTPATIENT)
Facility: HOSPITAL | Age: 65
End: 2025-08-06

## (undated) DEVICE — DRAIN SURG W7MMXL20CM SIL FULL PERF HUBLESS FLAT RADPQ STRP

## (undated) DEVICE — GLOVE SURG SZ 85 L12IN FNGR THK79MIL GRN LTX FREE

## (undated) DEVICE — KIT URIN CATH 16 FR 5 CC BG W/ M INDWL SIL

## (undated) DEVICE — Device

## (undated) DEVICE — SUTURE STRATAFIX SYMMETRIC PDS + ETHIGUARD SZ 1 L18IN ABSRB SXPP1A300

## (undated) DEVICE — EVACUATOR SMOKE L 10 FT SMOKE MANAGEMENT EXTENDED EDGE ELECTRODE STERILE DISP

## (undated) DEVICE — APPLICATOR MEDICATED 26 CC SOLUTION HI LT ORNG CHLORAPREP

## (undated) DEVICE — DRESSING FOAM 4X8 IN OPTIFOAM GENTLE POSTOP

## (undated) DEVICE — KIT POS PUR W/ FOAM FRME JACK TBL 4 BOUF CVR PLLW FRME HD

## (undated) DEVICE — SUTURE 2 0 STRATAFIX SYMMETRIC PDS + 60CM CT 1 SXPP1A439

## (undated) DEVICE — ELECTRODE PT RET AD L9FT HI MOIST COND ADH HYDRGEL CORDED

## (undated) DEVICE — ADHESIVE SKIN CLOSURE WND 8.661X1.5 IN 22 CM LIQUIBAND SECUR

## (undated) DEVICE — SUTURE MONOCRYL SZ 3-0 L27IN ABSRB UD L24MM PS-1 3/8 CIR PRIM Y936H

## (undated) DEVICE — GLOVE SURG SZ 8 CRM LTX FREE POLYISOPRENE POLYMER BEAD ANTI

## (undated) DEVICE — TUBING SUCT 10FR MAL ALUM SHFT FN CAP VENT UNIV CONN W/ OBT

## (undated) DEVICE — DRESSING FOAM 1IN 4MM H DISK CHG IMPREG AEGIS

## (undated) DEVICE — GLOVE SURG SZ 65 CRM LTX FREE POLYISOPRENE POLYMER BEAD ANTI

## (undated) DEVICE — GLOVE SURG SZ 65 L12IN FNGR THK79MIL GRN LTX FREE

## (undated) DEVICE — SOLUTION IRRIG 1000ML 0.9% SOD CHL USP POUR PLAS BTL

## (undated) DEVICE — BUR SURG DIA3MM PRECIS NEURO 5 STP NOTCH EXPOSE MRK ELITE

## (undated) DEVICE — KIT ARMOR C DRP COLLAPSIBLE AND SELF EXP TOP CVR FOR FLUOROSCOPIC

## (undated) DEVICE — WAX SURG 2.5GM HEMSTAT BNE BEESWAX PARAFFIN ISO PALMITATE